# Patient Record
Sex: FEMALE | Race: WHITE | Employment: FULL TIME | URBAN - METROPOLITAN AREA
[De-identification: names, ages, dates, MRNs, and addresses within clinical notes are randomized per-mention and may not be internally consistent; named-entity substitution may affect disease eponyms.]

---

## 2017-01-18 ENCOUNTER — TELEPHONE (OUTPATIENT)
Dept: FAMILY MEDICINE CLINIC | Age: 47
End: 2017-01-18

## 2017-02-14 RX ORDER — TRAMADOL HYDROCHLORIDE 50 MG/1
TABLET ORAL
Qty: 120 TABLET | Refills: 0 | Status: SHIPPED | OUTPATIENT
Start: 2017-02-14 | End: 2019-11-01

## 2017-05-01 ENCOUNTER — OFFICE VISIT (OUTPATIENT)
Dept: FAMILY MEDICINE CLINIC | Age: 47
End: 2017-05-01

## 2017-05-01 VITALS
WEIGHT: 162.4 LBS | DIASTOLIC BLOOD PRESSURE: 62 MMHG | SYSTOLIC BLOOD PRESSURE: 112 MMHG | BODY MASS INDEX: 23.3 KG/M2 | HEART RATE: 68 BPM

## 2017-05-01 DIAGNOSIS — F90.0 ADHD, PREDOMINANTLY INATTENTIVE TYPE: Primary | ICD-10-CM

## 2017-05-01 DIAGNOSIS — M54.31 SCIATICA OF RIGHT SIDE: ICD-10-CM

## 2017-05-01 DIAGNOSIS — M96.1 POST LAMINECTOMY SYNDROME: ICD-10-CM

## 2017-05-01 PROCEDURE — 99213 OFFICE O/P EST LOW 20 MIN: CPT | Performed by: FAMILY MEDICINE

## 2017-05-01 RX ORDER — DEXTROAMPHETAMINE SACCHARATE, AMPHETAMINE ASPARTATE MONOHYDRATE, DEXTROAMPHETAMINE SULFATE AND AMPHETAMINE SULFATE 7.5; 7.5; 7.5; 7.5 MG/1; MG/1; MG/1; MG/1
30 CAPSULE, EXTENDED RELEASE ORAL EVERY MORNING
Qty: 30 CAPSULE | Refills: 0 | Status: SHIPPED | OUTPATIENT
Start: 2017-05-01 | End: 2017-08-04 | Stop reason: SDUPTHER

## 2017-05-01 RX ORDER — DEXTROAMPHETAMINE SACCHARATE, AMPHETAMINE ASPARTATE, DEXTROAMPHETAMINE SULFATE AND AMPHETAMINE SULFATE 5; 5; 5; 5 MG/1; MG/1; MG/1; MG/1
20 TABLET ORAL DAILY
Qty: 30 TABLET | Refills: 0 | Status: SHIPPED | OUTPATIENT
Start: 2017-05-01 | End: 2017-08-04 | Stop reason: SDUPTHER

## 2017-05-01 RX ORDER — TRAMADOL HYDROCHLORIDE 50 MG/1
50 TABLET ORAL EVERY 6 HOURS PRN
Qty: 120 TABLET | Refills: 0 | Status: SHIPPED | OUTPATIENT
Start: 2017-05-01 | End: 2017-06-30 | Stop reason: SDUPTHER

## 2017-06-30 RX ORDER — TRAMADOL HYDROCHLORIDE 50 MG/1
TABLET ORAL
Qty: 120 TABLET | Refills: 0 | Status: SHIPPED | OUTPATIENT
Start: 2017-06-30 | End: 2017-12-07 | Stop reason: SDUPTHER

## 2017-08-04 ENCOUNTER — OFFICE VISIT (OUTPATIENT)
Dept: FAMILY MEDICINE CLINIC | Age: 47
End: 2017-08-04

## 2017-08-04 VITALS
HEART RATE: 64 BPM | DIASTOLIC BLOOD PRESSURE: 68 MMHG | SYSTOLIC BLOOD PRESSURE: 108 MMHG | BODY MASS INDEX: 23.39 KG/M2 | WEIGHT: 163 LBS

## 2017-08-04 DIAGNOSIS — F90.0 ADHD, PREDOMINANTLY INATTENTIVE TYPE: Primary | ICD-10-CM

## 2017-08-04 PROCEDURE — 99213 OFFICE O/P EST LOW 20 MIN: CPT | Performed by: FAMILY MEDICINE

## 2017-08-04 RX ORDER — DEXTROAMPHETAMINE SACCHARATE, AMPHETAMINE ASPARTATE MONOHYDRATE, DEXTROAMPHETAMINE SULFATE AND AMPHETAMINE SULFATE 7.5; 7.5; 7.5; 7.5 MG/1; MG/1; MG/1; MG/1
30 CAPSULE, EXTENDED RELEASE ORAL EVERY MORNING
Qty: 30 CAPSULE | Refills: 0 | Status: SHIPPED | OUTPATIENT
Start: 2017-08-04 | End: 2017-11-22 | Stop reason: SDUPTHER

## 2017-08-04 RX ORDER — DEXTROAMPHETAMINE SACCHARATE, AMPHETAMINE ASPARTATE, DEXTROAMPHETAMINE SULFATE AND AMPHETAMINE SULFATE 5; 5; 5; 5 MG/1; MG/1; MG/1; MG/1
20 TABLET ORAL DAILY
Qty: 30 TABLET | Refills: 0 | Status: SHIPPED | OUTPATIENT
Start: 2017-08-04 | End: 2017-11-22 | Stop reason: SDUPTHER

## 2017-08-04 RX ORDER — DEXTROAMPHETAMINE SACCHARATE, AMPHETAMINE ASPARTATE, DEXTROAMPHETAMINE SULFATE AND AMPHETAMINE SULFATE 5; 5; 5; 5 MG/1; MG/1; MG/1; MG/1
20 TABLET ORAL DAILY
Qty: 30 TABLET | Refills: 0 | Status: SHIPPED | OUTPATIENT
Start: 2017-08-04 | End: 2017-09-18 | Stop reason: SDUPTHER

## 2017-08-04 RX ORDER — DEXTROAMPHETAMINE SACCHARATE, AMPHETAMINE ASPARTATE, DEXTROAMPHETAMINE SULFATE AND AMPHETAMINE SULFATE 5; 5; 5; 5 MG/1; MG/1; MG/1; MG/1
20 TABLET ORAL DAILY
Qty: 30 TABLET | Refills: 0 | Status: SHIPPED | OUTPATIENT
Start: 2017-08-04 | End: 2018-03-12 | Stop reason: SDUPTHER

## 2017-08-04 RX ORDER — DEXTROAMPHETAMINE SACCHARATE, AMPHETAMINE ASPARTATE MONOHYDRATE, DEXTROAMPHETAMINE SULFATE AND AMPHETAMINE SULFATE 7.5; 7.5; 7.5; 7.5 MG/1; MG/1; MG/1; MG/1
30 CAPSULE, EXTENDED RELEASE ORAL EVERY MORNING
Qty: 30 CAPSULE | Refills: 0 | Status: SHIPPED | OUTPATIENT
Start: 2017-08-04 | End: 2017-11-02 | Stop reason: SDUPTHER

## 2017-09-18 RX ORDER — DEXTROAMPHETAMINE SACCHARATE, AMPHETAMINE ASPARTATE, DEXTROAMPHETAMINE SULFATE AND AMPHETAMINE SULFATE 5; 5; 5; 5 MG/1; MG/1; MG/1; MG/1
20 TABLET ORAL DAILY
Qty: 30 TABLET | Refills: 0 | Status: SHIPPED | OUTPATIENT
Start: 2017-09-18 | End: 2017-11-22 | Stop reason: SDUPTHER

## 2017-11-02 ENCOUNTER — TELEPHONE (OUTPATIENT)
Dept: FAMILY MEDICINE CLINIC | Age: 47
End: 2017-11-02

## 2017-11-02 RX ORDER — DEXTROAMPHETAMINE SACCHARATE, AMPHETAMINE ASPARTATE MONOHYDRATE, DEXTROAMPHETAMINE SULFATE AND AMPHETAMINE SULFATE 7.5; 7.5; 7.5; 7.5 MG/1; MG/1; MG/1; MG/1
30 CAPSULE, EXTENDED RELEASE ORAL EVERY MORNING
Qty: 30 CAPSULE | Refills: 0 | Status: SHIPPED | OUTPATIENT
Start: 2017-11-02 | End: 2017-11-22 | Stop reason: SDUPTHER

## 2017-11-02 NOTE — TELEPHONE ENCOUNTER
Pt states she needs a refill of Adderall XR 30 mg. She still has some Adderall 20 mg.       Last office visit 8/4/17  Future visit 11/7/17

## 2017-11-22 ENCOUNTER — OFFICE VISIT (OUTPATIENT)
Dept: FAMILY MEDICINE CLINIC | Age: 47
End: 2017-11-22

## 2017-11-22 VITALS
WEIGHT: 165 LBS | HEART RATE: 73 BPM | DIASTOLIC BLOOD PRESSURE: 88 MMHG | BODY MASS INDEX: 23.68 KG/M2 | SYSTOLIC BLOOD PRESSURE: 130 MMHG

## 2017-11-22 DIAGNOSIS — F90.0 ADHD, PREDOMINANTLY INATTENTIVE TYPE: Primary | ICD-10-CM

## 2017-11-22 PROCEDURE — 99213 OFFICE O/P EST LOW 20 MIN: CPT | Performed by: FAMILY MEDICINE

## 2017-11-22 RX ORDER — DEXTROAMPHETAMINE SACCHARATE, AMPHETAMINE ASPARTATE, DEXTROAMPHETAMINE SULFATE AND AMPHETAMINE SULFATE 5; 5; 5; 5 MG/1; MG/1; MG/1; MG/1
20 TABLET ORAL DAILY
Qty: 30 TABLET | Refills: 0 | Status: SHIPPED | OUTPATIENT
Start: 2017-11-22 | End: 2018-03-02 | Stop reason: SDUPTHER

## 2017-11-22 RX ORDER — DEXTROAMPHETAMINE SACCHARATE, AMPHETAMINE ASPARTATE MONOHYDRATE, DEXTROAMPHETAMINE SULFATE AND AMPHETAMINE SULFATE 7.5; 7.5; 7.5; 7.5 MG/1; MG/1; MG/1; MG/1
30 CAPSULE, EXTENDED RELEASE ORAL EVERY MORNING
Qty: 30 CAPSULE | Refills: 0 | Status: SHIPPED | OUTPATIENT
Start: 2017-11-22 | End: 2018-03-12 | Stop reason: SDUPTHER

## 2017-11-22 RX ORDER — DEXTROAMPHETAMINE SACCHARATE, AMPHETAMINE ASPARTATE, DEXTROAMPHETAMINE SULFATE AND AMPHETAMINE SULFATE 5; 5; 5; 5 MG/1; MG/1; MG/1; MG/1
20 TABLET ORAL DAILY
Qty: 30 TABLET | Refills: 0 | Status: SHIPPED | OUTPATIENT
Start: 2017-11-22 | End: 2018-03-12 | Stop reason: SDUPTHER

## 2017-11-22 RX ORDER — DEXTROAMPHETAMINE SACCHARATE, AMPHETAMINE ASPARTATE MONOHYDRATE, DEXTROAMPHETAMINE SULFATE AND AMPHETAMINE SULFATE 7.5; 7.5; 7.5; 7.5 MG/1; MG/1; MG/1; MG/1
30 CAPSULE, EXTENDED RELEASE ORAL EVERY MORNING
Qty: 30 CAPSULE | Refills: 0 | Status: SHIPPED | OUTPATIENT
Start: 2017-11-22 | End: 2018-03-02 | Stop reason: SDUPTHER

## 2017-11-22 ASSESSMENT — PATIENT HEALTH QUESTIONNAIRE - PHQ9
2. FEELING DOWN, DEPRESSED OR HOPELESS: 0
SUM OF ALL RESPONSES TO PHQ9 QUESTIONS 1 & 2: 0
SUM OF ALL RESPONSES TO PHQ QUESTIONS 1-9: 0
1. LITTLE INTEREST OR PLEASURE IN DOING THINGS: 0

## 2017-11-22 NOTE — PROGRESS NOTES
Subjective:      Patient ID: Chrys Simmonds is a 49 y.o. female. HPIAdderall regimen working well. Focus good, completing tasks,less distractible. No SEs    Review of Systems    Objective:   Physical Exam   Constitutional: She appears well-developed and well-nourished. Cardiovascular: Normal rate, regular rhythm and normal heart sounds. No murmur heard. Pulmonary/Chest: Effort normal and breath sounds normal.   Psychiatric: She has a normal mood and affect. Her behavior is normal.     Vitals:    11/22/17 1525   BP: 130/88   Pulse: 73   Weight: 165 lb (74.8 kg)     Controlled Substances Monitoring:     Attestation: The Prescription Monitoring Report for this patient was reviewed today. Arian Orantes MD)  Documentation: No signs of potential drug abuse or diversion identified.  Arian Orantes MD)      Assessment:    ADHD controlled          Plan:      Refill meds  Return in 6 mos

## 2017-12-09 RX ORDER — TRAMADOL HYDROCHLORIDE 50 MG/1
TABLET ORAL
Qty: 120 TABLET | Refills: 0 | Status: SHIPPED | OUTPATIENT
Start: 2017-12-09 | End: 2018-03-29 | Stop reason: SDUPTHER

## 2018-03-01 ENCOUNTER — TELEPHONE (OUTPATIENT)
Dept: FAMILY MEDICINE CLINIC | Age: 48
End: 2018-03-01

## 2018-03-02 ENCOUNTER — TELEPHONE (OUTPATIENT)
Dept: FAMILY MEDICINE CLINIC | Age: 48
End: 2018-03-02

## 2018-03-02 DIAGNOSIS — F90.0 ADHD, PREDOMINANTLY INATTENTIVE TYPE: Primary | ICD-10-CM

## 2018-03-02 RX ORDER — DEXTROAMPHETAMINE SACCHARATE, AMPHETAMINE ASPARTATE MONOHYDRATE, DEXTROAMPHETAMINE SULFATE AND AMPHETAMINE SULFATE 7.5; 7.5; 7.5; 7.5 MG/1; MG/1; MG/1; MG/1
30 CAPSULE, EXTENDED RELEASE ORAL EVERY MORNING
Qty: 30 CAPSULE | Refills: 0 | Status: SHIPPED | OUTPATIENT
Start: 2018-03-02 | End: 2018-03-12 | Stop reason: SDUPTHER

## 2018-03-02 RX ORDER — DEXTROAMPHETAMINE SACCHARATE, AMPHETAMINE ASPARTATE, DEXTROAMPHETAMINE SULFATE AND AMPHETAMINE SULFATE 5; 5; 5; 5 MG/1; MG/1; MG/1; MG/1
20 TABLET ORAL DAILY
Qty: 30 TABLET | Refills: 0 | Status: SHIPPED | OUTPATIENT
Start: 2018-03-02 | End: 2018-03-12 | Stop reason: SDUPTHER

## 2018-03-02 NOTE — TELEPHONE ENCOUNTER
Per the message yesterday, the pt needed the Adderall 20 mg. A script for Adderall XR 30 was sent over by mistake. Please send ovver the Adderall 20 mg.     Last office visit 11/22/17  Future appt 3/5/18

## 2018-03-12 ENCOUNTER — OFFICE VISIT (OUTPATIENT)
Dept: FAMILY MEDICINE CLINIC | Age: 48
End: 2018-03-12

## 2018-03-12 VITALS
BODY MASS INDEX: 23.48 KG/M2 | SYSTOLIC BLOOD PRESSURE: 104 MMHG | HEIGHT: 70 IN | HEART RATE: 61 BPM | WEIGHT: 164 LBS | DIASTOLIC BLOOD PRESSURE: 82 MMHG

## 2018-03-12 DIAGNOSIS — F90.0 ATTENTION DEFICIT HYPERACTIVITY DISORDER (ADHD), PREDOMINANTLY INATTENTIVE TYPE: Primary | ICD-10-CM

## 2018-03-12 PROCEDURE — 99213 OFFICE O/P EST LOW 20 MIN: CPT | Performed by: FAMILY MEDICINE

## 2018-03-12 RX ORDER — DEXTROAMPHETAMINE SACCHARATE, AMPHETAMINE ASPARTATE, DEXTROAMPHETAMINE SULFATE AND AMPHETAMINE SULFATE 5; 5; 5; 5 MG/1; MG/1; MG/1; MG/1
20 TABLET ORAL DAILY
Qty: 30 TABLET | Refills: 0 | Status: SHIPPED | OUTPATIENT
Start: 2018-04-01 | End: 2018-07-02 | Stop reason: SDUPTHER

## 2018-03-12 RX ORDER — DEXTROAMPHETAMINE SACCHARATE, AMPHETAMINE ASPARTATE, DEXTROAMPHETAMINE SULFATE AND AMPHETAMINE SULFATE 5; 5; 5; 5 MG/1; MG/1; MG/1; MG/1
20 TABLET ORAL DAILY
Qty: 30 TABLET | Refills: 0 | Status: SHIPPED | OUTPATIENT
Start: 2018-05-01 | End: 2018-07-03 | Stop reason: SDUPTHER

## 2018-03-12 RX ORDER — DEXTROAMPHETAMINE SACCHARATE, AMPHETAMINE ASPARTATE MONOHYDRATE, DEXTROAMPHETAMINE SULFATE AND AMPHETAMINE SULFATE 7.5; 7.5; 7.5; 7.5 MG/1; MG/1; MG/1; MG/1
30 CAPSULE, EXTENDED RELEASE ORAL EVERY MORNING
Qty: 30 CAPSULE | Refills: 0 | Status: SHIPPED | OUTPATIENT
Start: 2018-04-12 | End: 2018-07-03 | Stop reason: SDUPTHER

## 2018-03-12 RX ORDER — DEXTROAMPHETAMINE SACCHARATE, AMPHETAMINE ASPARTATE, DEXTROAMPHETAMINE SULFATE AND AMPHETAMINE SULFATE 5; 5; 5; 5 MG/1; MG/1; MG/1; MG/1
20 TABLET ORAL DAILY
Qty: 30 TABLET | Refills: 0 | Status: SHIPPED | OUTPATIENT
Start: 2018-05-31 | End: 2018-10-03 | Stop reason: SDUPTHER

## 2018-03-12 RX ORDER — DEXTROAMPHETAMINE SACCHARATE, AMPHETAMINE ASPARTATE MONOHYDRATE, DEXTROAMPHETAMINE SULFATE AND AMPHETAMINE SULFATE 7.5; 7.5; 7.5; 7.5 MG/1; MG/1; MG/1; MG/1
30 CAPSULE, EXTENDED RELEASE ORAL EVERY MORNING
Qty: 30 CAPSULE | Refills: 0 | Status: SHIPPED | OUTPATIENT
Start: 2018-05-12 | End: 2019-01-10 | Stop reason: SDUPTHER

## 2018-03-12 RX ORDER — DEXTROAMPHETAMINE SACCHARATE, AMPHETAMINE ASPARTATE MONOHYDRATE, DEXTROAMPHETAMINE SULFATE AND AMPHETAMINE SULFATE 7.5; 7.5; 7.5; 7.5 MG/1; MG/1; MG/1; MG/1
30 CAPSULE, EXTENDED RELEASE ORAL EVERY MORNING
Qty: 30 CAPSULE | Refills: 0 | Status: SHIPPED | OUTPATIENT
Start: 2018-03-13 | End: 2018-10-03 | Stop reason: SDUPTHER

## 2018-03-12 NOTE — PROGRESS NOTES
Subjective:      Patient ID: Gayatri Serrano is a 49 y.o. female. HPI  No change in health. 1st dose lasts ~ 8 hrs, then she feels less engaged in activities, less focussed. 2nd dose lasts until supper. Lunch appetite suppressed. No palpitations, no headaches  Tramadol when the back ache flares and not controlled on ibuprofen    Review of Systems    Objective:   Physical Exam   Constitutional: She appears well-developed and well-nourished. Cardiovascular: Normal rate, regular rhythm and normal heart sounds. Pulmonary/Chest: Effort normal and breath sounds normal.   Psychiatric: She has a normal mood and affect. Her behavior is normal.       Assessment:      ADHD, controlled      Plan:      Refill meds  Call in 3 months and see me in 6 mos.

## 2018-03-29 DIAGNOSIS — M54.31 SCIATICA OF RIGHT SIDE: Primary | ICD-10-CM

## 2018-03-29 RX ORDER — TRAMADOL HYDROCHLORIDE 50 MG/1
TABLET ORAL
Qty: 120 TABLET | Refills: 0 | Status: SHIPPED | OUTPATIENT
Start: 2018-03-29 | End: 2018-04-28

## 2018-06-21 ENCOUNTER — TELEPHONE (OUTPATIENT)
Dept: FAMILY MEDICINE CLINIC | Age: 48
End: 2018-06-21

## 2018-06-21 RX ORDER — ACYCLOVIR 400 MG/1
400 TABLET ORAL 3 TIMES DAILY
Qty: 30 TABLET | Refills: 0 | Status: SHIPPED | OUTPATIENT
Start: 2018-06-21 | End: 2018-07-01

## 2018-06-22 ENCOUNTER — TELEPHONE (OUTPATIENT)
Dept: FAMILY MEDICINE CLINIC | Age: 48
End: 2018-06-22

## 2018-07-02 DIAGNOSIS — F90.0 ADHD, PREDOMINANTLY INATTENTIVE TYPE: Primary | ICD-10-CM

## 2018-07-03 RX ORDER — DEXTROAMPHETAMINE SACCHARATE, AMPHETAMINE ASPARTATE MONOHYDRATE, DEXTROAMPHETAMINE SULFATE AND AMPHETAMINE SULFATE 7.5; 7.5; 7.5; 7.5 MG/1; MG/1; MG/1; MG/1
30 CAPSULE, EXTENDED RELEASE ORAL EVERY MORNING
Qty: 30 CAPSULE | Refills: 0 | Status: SHIPPED | OUTPATIENT
Start: 2018-08-02 | End: 2018-08-31 | Stop reason: SDUPTHER

## 2018-07-03 RX ORDER — DEXTROAMPHETAMINE SACCHARATE, AMPHETAMINE ASPARTATE, DEXTROAMPHETAMINE SULFATE AND AMPHETAMINE SULFATE 5; 5; 5; 5 MG/1; MG/1; MG/1; MG/1
20 TABLET ORAL DAILY
Qty: 30 TABLET | Refills: 0 | Status: SHIPPED | OUTPATIENT
Start: 2018-07-03 | End: 2018-10-03 | Stop reason: SDUPTHER

## 2018-07-03 RX ORDER — DEXTROAMPHETAMINE SACCHARATE, AMPHETAMINE ASPARTATE MONOHYDRATE, DEXTROAMPHETAMINE SULFATE AND AMPHETAMINE SULFATE 7.5; 7.5; 7.5; 7.5 MG/1; MG/1; MG/1; MG/1
30 CAPSULE, EXTENDED RELEASE ORAL EVERY MORNING
Qty: 30 CAPSULE | Refills: 0 | Status: SHIPPED | OUTPATIENT
Start: 2018-07-03 | End: 2018-10-03 | Stop reason: SDUPTHER

## 2018-07-03 RX ORDER — DEXTROAMPHETAMINE SACCHARATE, AMPHETAMINE ASPARTATE, DEXTROAMPHETAMINE SULFATE AND AMPHETAMINE SULFATE 5; 5; 5; 5 MG/1; MG/1; MG/1; MG/1
20 TABLET ORAL DAILY
Qty: 30 TABLET | Refills: 0 | Status: SHIPPED | OUTPATIENT
Start: 2018-08-02 | End: 2018-08-31 | Stop reason: SDUPTHER

## 2018-08-31 DIAGNOSIS — F90.0 ADHD, PREDOMINANTLY INATTENTIVE TYPE: ICD-10-CM

## 2018-08-31 RX ORDER — DEXTROAMPHETAMINE SACCHARATE, AMPHETAMINE ASPARTATE MONOHYDRATE, DEXTROAMPHETAMINE SULFATE AND AMPHETAMINE SULFATE 7.5; 7.5; 7.5; 7.5 MG/1; MG/1; MG/1; MG/1
30 CAPSULE, EXTENDED RELEASE ORAL EVERY MORNING
Qty: 30 CAPSULE | Refills: 0 | Status: SHIPPED | OUTPATIENT
Start: 2018-08-31 | End: 2018-10-03 | Stop reason: SDUPTHER

## 2018-08-31 RX ORDER — DEXTROAMPHETAMINE SACCHARATE, AMPHETAMINE ASPARTATE, DEXTROAMPHETAMINE SULFATE AND AMPHETAMINE SULFATE 5; 5; 5; 5 MG/1; MG/1; MG/1; MG/1
20 TABLET ORAL DAILY
Qty: 30 TABLET | Refills: 0 | Status: SHIPPED | OUTPATIENT
Start: 2018-08-31 | End: 2018-09-28 | Stop reason: SDUPTHER

## 2018-08-31 NOTE — TELEPHONE ENCOUNTER
Pt  ADDERALL  Was called in but it is the wrong mg she asked for the 20 mg   They filled for amphetamine-dextroamphetamine (ADDERALL XR) 30 MG extended release capsule      [id number], [place] - [address] [facility] -  [phone]

## 2018-09-12 ENCOUNTER — TELEPHONE (OUTPATIENT)
Dept: FAMILY MEDICINE CLINIC | Age: 48
End: 2018-09-12

## 2018-09-28 ENCOUNTER — TELEPHONE (OUTPATIENT)
Dept: FAMILY MEDICINE CLINIC | Age: 48
End: 2018-09-28

## 2018-09-28 DIAGNOSIS — F90.0 ADHD, PREDOMINANTLY INATTENTIVE TYPE: ICD-10-CM

## 2018-09-28 RX ORDER — DEXTROAMPHETAMINE SACCHARATE, AMPHETAMINE ASPARTATE, DEXTROAMPHETAMINE SULFATE AND AMPHETAMINE SULFATE 5; 5; 5; 5 MG/1; MG/1; MG/1; MG/1
20 TABLET ORAL DAILY
Qty: 30 TABLET | Refills: 0 | Status: SHIPPED | OUTPATIENT
Start: 2018-09-28 | End: 2018-10-03 | Stop reason: SDUPTHER

## 2018-09-28 NOTE — TELEPHONE ENCOUNTER
Pt an appointment and would like to know if she can have the refill for the  amphetamine-dextroamphetamine (ADDERALL, 20MG,) 20 MG tablet     Her future appointment is on 10/3/2018      [id number]- [place], [place] - [address] [place] [address] -  [phone]

## 2018-10-03 ENCOUNTER — OFFICE VISIT (OUTPATIENT)
Dept: FAMILY MEDICINE CLINIC | Age: 48
End: 2018-10-03
Payer: COMMERCIAL

## 2018-10-03 VITALS
WEIGHT: 160 LBS | DIASTOLIC BLOOD PRESSURE: 70 MMHG | RESPIRATION RATE: 14 BRPM | HEART RATE: 88 BPM | SYSTOLIC BLOOD PRESSURE: 120 MMHG | BODY MASS INDEX: 22.96 KG/M2 | OXYGEN SATURATION: 99 %

## 2018-10-03 DIAGNOSIS — F17.200 TOBACCO USE DISORDER: Primary | ICD-10-CM

## 2018-10-03 DIAGNOSIS — F90.0 ADHD, PREDOMINANTLY INATTENTIVE TYPE: ICD-10-CM

## 2018-10-03 PROCEDURE — 99213 OFFICE O/P EST LOW 20 MIN: CPT | Performed by: FAMILY MEDICINE

## 2018-10-03 RX ORDER — DEXTROAMPHETAMINE SACCHARATE, AMPHETAMINE ASPARTATE, DEXTROAMPHETAMINE SULFATE AND AMPHETAMINE SULFATE 5; 5; 5; 5 MG/1; MG/1; MG/1; MG/1
20 TABLET ORAL DAILY
Qty: 30 TABLET | Refills: 0 | Status: SHIPPED | OUTPATIENT
Start: 2018-11-27 | End: 2019-01-11 | Stop reason: SDUPTHER

## 2018-10-03 RX ORDER — DEXTROAMPHETAMINE SACCHARATE, AMPHETAMINE ASPARTATE MONOHYDRATE, DEXTROAMPHETAMINE SULFATE AND AMPHETAMINE SULFATE 7.5; 7.5; 7.5; 7.5 MG/1; MG/1; MG/1; MG/1
30 CAPSULE, EXTENDED RELEASE ORAL EVERY MORNING
Qty: 30 CAPSULE | Refills: 0 | Status: SHIPPED | OUTPATIENT
Start: 2018-12-09 | End: 2019-01-11 | Stop reason: SDUPTHER

## 2018-10-03 RX ORDER — VARENICLINE TARTRATE 1 MG/1
1 TABLET, FILM COATED ORAL 2 TIMES DAILY
Qty: 60 TABLET | Refills: 3 | Status: SHIPPED | OUTPATIENT
Start: 2018-10-03 | End: 2020-06-04

## 2018-10-03 RX ORDER — DEXTROAMPHETAMINE SACCHARATE, AMPHETAMINE ASPARTATE MONOHYDRATE, DEXTROAMPHETAMINE SULFATE AND AMPHETAMINE SULFATE 7.5; 7.5; 7.5; 7.5 MG/1; MG/1; MG/1; MG/1
30 CAPSULE, EXTENDED RELEASE ORAL EVERY MORNING
Qty: 30 CAPSULE | Refills: 0 | Status: SHIPPED | OUTPATIENT
Start: 2018-11-09 | End: 2019-01-11 | Stop reason: SDUPTHER

## 2018-10-03 RX ORDER — VARENICLINE TARTRATE 25 MG
KIT ORAL
Qty: 1 EACH | Refills: 0 | Status: SHIPPED | OUTPATIENT
Start: 2018-10-03 | End: 2018-11-18 | Stop reason: SDUPTHER

## 2018-10-03 RX ORDER — DEXTROAMPHETAMINE SACCHARATE, AMPHETAMINE ASPARTATE, DEXTROAMPHETAMINE SULFATE AND AMPHETAMINE SULFATE 5; 5; 5; 5 MG/1; MG/1; MG/1; MG/1
20 TABLET ORAL DAILY
Qty: 30 TABLET | Refills: 0 | Status: SHIPPED | OUTPATIENT
Start: 2018-12-27 | End: 2019-01-11 | Stop reason: SDUPTHER

## 2018-10-03 RX ORDER — DEXTROAMPHETAMINE SACCHARATE, AMPHETAMINE ASPARTATE, DEXTROAMPHETAMINE SULFATE AND AMPHETAMINE SULFATE 5; 5; 5; 5 MG/1; MG/1; MG/1; MG/1
20 TABLET ORAL DAILY
Qty: 30 TABLET | Refills: 0 | Status: SHIPPED | OUTPATIENT
Start: 2018-10-28 | End: 2019-04-18 | Stop reason: SDUPTHER

## 2018-10-03 RX ORDER — DEXTROAMPHETAMINE SACCHARATE, AMPHETAMINE ASPARTATE MONOHYDRATE, DEXTROAMPHETAMINE SULFATE AND AMPHETAMINE SULFATE 7.5; 7.5; 7.5; 7.5 MG/1; MG/1; MG/1; MG/1
30 CAPSULE, EXTENDED RELEASE ORAL EVERY MORNING
Qty: 30 CAPSULE | Refills: 0 | Status: SHIPPED | OUTPATIENT
Start: 2018-10-10 | End: 2021-10-19

## 2018-10-03 ASSESSMENT — PATIENT HEALTH QUESTIONNAIRE - PHQ9
SUM OF ALL RESPONSES TO PHQ QUESTIONS 1-9: 0
SUM OF ALL RESPONSES TO PHQ QUESTIONS 1-9: 0
2. FEELING DOWN, DEPRESSED OR HOPELESS: 0
SUM OF ALL RESPONSES TO PHQ9 QUESTIONS 1 & 2: 0
1. LITTLE INTEREST OR PLEASURE IN DOING THINGS: 0

## 2018-11-19 RX ORDER — VARENICLINE TARTRATE
KIT
Qty: 53 TABLET | Refills: 1 | Status: SHIPPED | OUTPATIENT
Start: 2018-11-19 | End: 2019-11-01

## 2019-01-10 ENCOUNTER — TELEPHONE (OUTPATIENT)
Dept: FAMILY MEDICINE CLINIC | Age: 49
End: 2019-01-10

## 2019-01-10 DIAGNOSIS — F90.0 ADHD, PREDOMINANTLY INATTENTIVE TYPE: ICD-10-CM

## 2019-01-10 RX ORDER — DEXTROAMPHETAMINE SACCHARATE, AMPHETAMINE ASPARTATE, DEXTROAMPHETAMINE SULFATE AND AMPHETAMINE SULFATE 5; 5; 5; 5 MG/1; MG/1; MG/1; MG/1
20 TABLET ORAL DAILY
Qty: 30 TABLET | Refills: 0 | Status: CANCELLED | OUTPATIENT
Start: 2019-01-10 | End: 2019-02-09

## 2019-01-11 DIAGNOSIS — F90.0 ADHD, PREDOMINANTLY INATTENTIVE TYPE: ICD-10-CM

## 2019-01-11 RX ORDER — DEXTROAMPHETAMINE SACCHARATE, AMPHETAMINE ASPARTATE, DEXTROAMPHETAMINE SULFATE AND AMPHETAMINE SULFATE 5; 5; 5; 5 MG/1; MG/1; MG/1; MG/1
20 TABLET ORAL DAILY
Qty: 30 TABLET | Refills: 0 | Status: SHIPPED | OUTPATIENT
Start: 2019-01-27 | End: 2019-04-18 | Stop reason: SDUPTHER

## 2019-01-11 RX ORDER — DEXTROAMPHETAMINE SACCHARATE, AMPHETAMINE ASPARTATE, DEXTROAMPHETAMINE SULFATE AND AMPHETAMINE SULFATE 5; 5; 5; 5 MG/1; MG/1; MG/1; MG/1
20 TABLET ORAL DAILY
Qty: 30 TABLET | Refills: 0 | Status: SHIPPED | OUTPATIENT
Start: 2019-02-26 | End: 2019-03-11 | Stop reason: SDUPTHER

## 2019-01-11 RX ORDER — DEXTROAMPHETAMINE SACCHARATE, AMPHETAMINE ASPARTATE MONOHYDRATE, DEXTROAMPHETAMINE SULFATE AND AMPHETAMINE SULFATE 7.5; 7.5; 7.5; 7.5 MG/1; MG/1; MG/1; MG/1
30 CAPSULE, EXTENDED RELEASE ORAL EVERY MORNING
Qty: 30 CAPSULE | Refills: 0 | Status: SHIPPED | OUTPATIENT
Start: 2019-01-11 | End: 2019-04-18 | Stop reason: SDUPTHER

## 2019-01-11 RX ORDER — DEXTROAMPHETAMINE SACCHARATE, AMPHETAMINE ASPARTATE MONOHYDRATE, DEXTROAMPHETAMINE SULFATE AND AMPHETAMINE SULFATE 7.5; 7.5; 7.5; 7.5 MG/1; MG/1; MG/1; MG/1
30 CAPSULE, EXTENDED RELEASE ORAL EVERY MORNING
Qty: 30 CAPSULE | Refills: 0 | Status: SHIPPED | OUTPATIENT
Start: 2019-02-10 | End: 2019-03-11 | Stop reason: SDUPTHER

## 2019-03-11 DIAGNOSIS — F90.0 ADHD, PREDOMINANTLY INATTENTIVE TYPE: ICD-10-CM

## 2019-03-11 RX ORDER — DEXTROAMPHETAMINE SACCHARATE, AMPHETAMINE ASPARTATE, DEXTROAMPHETAMINE SULFATE AND AMPHETAMINE SULFATE 5; 5; 5; 5 MG/1; MG/1; MG/1; MG/1
20 TABLET ORAL DAILY
Qty: 30 TABLET | Refills: 0 | Status: SHIPPED | OUTPATIENT
Start: 2019-03-11 | End: 2019-04-18 | Stop reason: SDUPTHER

## 2019-03-11 RX ORDER — DEXTROAMPHETAMINE SACCHARATE, AMPHETAMINE ASPARTATE MONOHYDRATE, DEXTROAMPHETAMINE SULFATE AND AMPHETAMINE SULFATE 7.5; 7.5; 7.5; 7.5 MG/1; MG/1; MG/1; MG/1
30 CAPSULE, EXTENDED RELEASE ORAL EVERY MORNING
Qty: 30 CAPSULE | Refills: 0 | Status: SHIPPED | OUTPATIENT
Start: 2019-03-11 | End: 2019-04-09 | Stop reason: SDUPTHER

## 2019-04-09 DIAGNOSIS — F90.0 ADHD, PREDOMINANTLY INATTENTIVE TYPE: ICD-10-CM

## 2019-04-09 RX ORDER — DEXTROAMPHETAMINE SACCHARATE, AMPHETAMINE ASPARTATE MONOHYDRATE, DEXTROAMPHETAMINE SULFATE AND AMPHETAMINE SULFATE 7.5; 7.5; 7.5; 7.5 MG/1; MG/1; MG/1; MG/1
30 CAPSULE, EXTENDED RELEASE ORAL EVERY MORNING
Qty: 30 CAPSULE | Refills: 0 | Status: SHIPPED | OUTPATIENT
Start: 2019-04-09 | End: 2019-07-09 | Stop reason: SDUPTHER

## 2019-04-09 NOTE — TELEPHONE ENCOUNTER
Medication:   Requested Prescriptions     Pending Prescriptions Disp Refills    amphetamine-dextroamphetamine (ADDERALL XR) 30 MG extended release capsule 30 capsule 0     Sig: Take 1 capsule by mouth every morning for 30 days. Last Filled: 03/11/2019     Last appt: 10/3/2018   Next appt: 4/18/2019    Last OARRS:   RX Monitoring 3/11/2019   Attestation The Prescription Monitoring Report for this patient was reviewed today.    Chronic Pain Routine Monitoring No signs of potential drug abuse or diversion identified: otherwise, see note documentation

## 2019-04-12 ENCOUNTER — TELEPHONE (OUTPATIENT)
Dept: FAMILY MEDICINE CLINIC | Age: 49
End: 2019-04-12

## 2019-04-12 NOTE — TELEPHONE ENCOUNTER
Phillip Hardy is calling. They need our office to call and do a PA for the Adderall XR 30 mg. There is no reference number. Call [phone].     LOV 10/3/18

## 2019-04-13 NOTE — TELEPHONE ENCOUNTER
Please call pt today about this. She is very frustrated. She says she spoke to someone last week about this. She says she was told by someone at our office that they would call and get a verbal on this. She also says that [address] sent a form to be filled out. No one filled it out yet. She says this happens very time. Please call her at [phone].

## 2019-04-13 NOTE — TELEPHONE ENCOUNTER
Pt called back again, no form was found. I tried to call for verbal was put on hold for 15 mins no one picked up. Someone will need to try on Monday.

## 2019-04-18 ENCOUNTER — OFFICE VISIT (OUTPATIENT)
Dept: FAMILY MEDICINE CLINIC | Age: 49
End: 2019-04-18
Payer: COMMERCIAL

## 2019-04-18 VITALS
HEART RATE: 80 BPM | WEIGHT: 166 LBS | SYSTOLIC BLOOD PRESSURE: 110 MMHG | BODY MASS INDEX: 23.82 KG/M2 | DIASTOLIC BLOOD PRESSURE: 74 MMHG

## 2019-04-18 DIAGNOSIS — F90.0 ADHD, PREDOMINANTLY INATTENTIVE TYPE: ICD-10-CM

## 2019-04-18 PROCEDURE — 99213 OFFICE O/P EST LOW 20 MIN: CPT | Performed by: FAMILY MEDICINE

## 2019-04-18 RX ORDER — DEXTROAMPHETAMINE SACCHARATE, AMPHETAMINE ASPARTATE MONOHYDRATE, DEXTROAMPHETAMINE SULFATE AND AMPHETAMINE SULFATE 7.5; 7.5; 7.5; 7.5 MG/1; MG/1; MG/1; MG/1
30 CAPSULE, EXTENDED RELEASE ORAL EVERY MORNING
Qty: 30 CAPSULE | Refills: 0 | Status: SHIPPED | OUTPATIENT
Start: 2019-05-18 | End: 2019-07-09 | Stop reason: SDUPTHER

## 2019-04-18 RX ORDER — DEXTROAMPHETAMINE SACCHARATE, AMPHETAMINE ASPARTATE MONOHYDRATE, DEXTROAMPHETAMINE SULFATE AND AMPHETAMINE SULFATE 7.5; 7.5; 7.5; 7.5 MG/1; MG/1; MG/1; MG/1
30 CAPSULE, EXTENDED RELEASE ORAL EVERY MORNING
Qty: 30 CAPSULE | Refills: 0 | Status: SHIPPED | OUTPATIENT
Start: 2019-06-17 | End: 2019-07-09 | Stop reason: SDUPTHER

## 2019-04-18 RX ORDER — DEXTROAMPHETAMINE SACCHARATE, AMPHETAMINE ASPARTATE, DEXTROAMPHETAMINE SULFATE AND AMPHETAMINE SULFATE 5; 5; 5; 5 MG/1; MG/1; MG/1; MG/1
20 TABLET ORAL DAILY
Qty: 30 TABLET | Refills: 0 | Status: SHIPPED | OUTPATIENT
Start: 2019-05-28 | End: 2019-07-09 | Stop reason: SDUPTHER

## 2019-04-18 RX ORDER — DEXTROAMPHETAMINE SACCHARATE, AMPHETAMINE ASPARTATE, DEXTROAMPHETAMINE SULFATE AND AMPHETAMINE SULFATE 5; 5; 5; 5 MG/1; MG/1; MG/1; MG/1
20 TABLET ORAL DAILY
Qty: 30 TABLET | Refills: 0 | Status: SHIPPED | OUTPATIENT
Start: 2019-06-27 | End: 2019-07-09 | Stop reason: SDUPTHER

## 2019-04-18 RX ORDER — DEXTROAMPHETAMINE SACCHARATE, AMPHETAMINE ASPARTATE, DEXTROAMPHETAMINE SULFATE AND AMPHETAMINE SULFATE 5; 5; 5; 5 MG/1; MG/1; MG/1; MG/1
20 TABLET ORAL DAILY
Qty: 30 TABLET | Refills: 0 | Status: SHIPPED | OUTPATIENT
Start: 2019-04-28 | End: 2019-07-09 | Stop reason: SDUPTHER

## 2019-04-18 ASSESSMENT — PATIENT HEALTH QUESTIONNAIRE - PHQ9
1. LITTLE INTEREST OR PLEASURE IN DOING THINGS: 0
SUM OF ALL RESPONSES TO PHQ QUESTIONS 1-9: 0
SUM OF ALL RESPONSES TO PHQ9 QUESTIONS 1 & 2: 0
SUM OF ALL RESPONSES TO PHQ QUESTIONS 1-9: 0
2. FEELING DOWN, DEPRESSED OR HOPELESS: 0

## 2019-04-18 NOTE — PROGRESS NOTES
Subjective:      Patient ID: John Nuñez is a 51 y.o. female. HPIBack pain is manageable with ibup and yoga    Chantix : has been taking 0.5 mg hs, and is down to 2 cigs per day. Taking the med in am causes nausea    Adderall working well, but PA process was required again. Finds she completes tasks much better while on the med. No palp, no insomnia, no weight loss  Review of Systems    Objective:   Physical Exam   Constitutional: She appears well-developed and well-nourished. Neck: Normal range of motion. Neck supple. No thyromegaly present. Cardiovascular: Normal rate and regular rhythm. No murmur heard. Pulmonary/Chest: Effort normal and breath sounds normal.   Lymphadenopathy:     She has no cervical adenopathy. Psychiatric: She has a normal mood and affect.  Her behavior is normal.     Vitals:    04/18/19 0909   BP: 110/74   Pulse: 80   Weight: 166 lb (75.3 kg)       Assessment:    ADHD        Plan:      Refill Adderall  6 mos        Piper Ocampo MD

## 2019-07-09 ENCOUNTER — TELEPHONE (OUTPATIENT)
Dept: FAMILY MEDICINE CLINIC | Age: 49
End: 2019-07-09

## 2019-07-09 DIAGNOSIS — F90.0 ADHD, PREDOMINANTLY INATTENTIVE TYPE: ICD-10-CM

## 2019-07-09 RX ORDER — DEXTROAMPHETAMINE SACCHARATE, AMPHETAMINE ASPARTATE, DEXTROAMPHETAMINE SULFATE AND AMPHETAMINE SULFATE 5; 5; 5; 5 MG/1; MG/1; MG/1; MG/1
20 TABLET ORAL DAILY
Qty: 30 TABLET | Refills: 0 | Status: SHIPPED | OUTPATIENT
Start: 2019-09-07 | End: 2019-10-11 | Stop reason: SDUPTHER

## 2019-07-09 RX ORDER — DEXTROAMPHETAMINE SACCHARATE, AMPHETAMINE ASPARTATE MONOHYDRATE, DEXTROAMPHETAMINE SULFATE AND AMPHETAMINE SULFATE 7.5; 7.5; 7.5; 7.5 MG/1; MG/1; MG/1; MG/1
30 CAPSULE, EXTENDED RELEASE ORAL EVERY MORNING
Qty: 30 CAPSULE | Refills: 0 | Status: SHIPPED | OUTPATIENT
Start: 2019-08-18 | End: 2019-10-11 | Stop reason: SDUPTHER

## 2019-07-09 RX ORDER — DEXTROAMPHETAMINE SACCHARATE, AMPHETAMINE ASPARTATE, DEXTROAMPHETAMINE SULFATE AND AMPHETAMINE SULFATE 5; 5; 5; 5 MG/1; MG/1; MG/1; MG/1
20 TABLET ORAL DAILY
Qty: 30 TABLET | Refills: 0 | Status: SHIPPED | OUTPATIENT
Start: 2019-08-08 | End: 2019-07-10 | Stop reason: SDUPTHER

## 2019-07-09 RX ORDER — DEXTROAMPHETAMINE SACCHARATE, AMPHETAMINE ASPARTATE MONOHYDRATE, DEXTROAMPHETAMINE SULFATE AND AMPHETAMINE SULFATE 7.5; 7.5; 7.5; 7.5 MG/1; MG/1; MG/1; MG/1
30 CAPSULE, EXTENDED RELEASE ORAL EVERY MORNING
Qty: 30 CAPSULE | Refills: 0 | Status: SHIPPED | OUTPATIENT
Start: 2019-07-19 | End: 2019-11-01 | Stop reason: SDUPTHER

## 2019-07-09 RX ORDER — DEXTROAMPHETAMINE SACCHARATE, AMPHETAMINE ASPARTATE MONOHYDRATE, DEXTROAMPHETAMINE SULFATE AND AMPHETAMINE SULFATE 7.5; 7.5; 7.5; 7.5 MG/1; MG/1; MG/1; MG/1
30 CAPSULE, EXTENDED RELEASE ORAL EVERY MORNING
Qty: 30 CAPSULE | Refills: 0 | Status: SHIPPED | OUTPATIENT
Start: 2019-09-17 | End: 2019-11-01 | Stop reason: SDUPTHER

## 2019-07-09 RX ORDER — DEXTROAMPHETAMINE SACCHARATE, AMPHETAMINE ASPARTATE, DEXTROAMPHETAMINE SULFATE AND AMPHETAMINE SULFATE 5; 5; 5; 5 MG/1; MG/1; MG/1; MG/1
20 TABLET ORAL DAILY
Qty: 30 TABLET | Refills: 0 | Status: SHIPPED | OUTPATIENT
Start: 2019-07-09 | End: 2019-11-01 | Stop reason: SDUPTHER

## 2019-07-10 DIAGNOSIS — F90.0 ADHD, PREDOMINANTLY INATTENTIVE TYPE: ICD-10-CM

## 2019-07-10 RX ORDER — DEXTROAMPHETAMINE SACCHARATE, AMPHETAMINE ASPARTATE, DEXTROAMPHETAMINE SULFATE AND AMPHETAMINE SULFATE 5; 5; 5; 5 MG/1; MG/1; MG/1; MG/1
20 TABLET ORAL DAILY
Qty: 30 TABLET | Refills: 0 | Status: SHIPPED | OUTPATIENT
Start: 2019-08-08 | End: 2019-11-01 | Stop reason: SDUPTHER

## 2019-10-09 ENCOUNTER — TELEPHONE (OUTPATIENT)
Dept: PRIMARY CARE CLINIC | Age: 49
End: 2019-10-09

## 2019-10-11 DIAGNOSIS — F90.0 ADHD, PREDOMINANTLY INATTENTIVE TYPE: ICD-10-CM

## 2019-10-11 RX ORDER — DEXTROAMPHETAMINE SACCHARATE, AMPHETAMINE ASPARTATE MONOHYDRATE, DEXTROAMPHETAMINE SULFATE AND AMPHETAMINE SULFATE 7.5; 7.5; 7.5; 7.5 MG/1; MG/1; MG/1; MG/1
30 CAPSULE, EXTENDED RELEASE ORAL EVERY MORNING
Qty: 30 CAPSULE | Refills: 0 | Status: SHIPPED | OUTPATIENT
Start: 2019-10-11 | End: 2019-11-01 | Stop reason: SDUPTHER

## 2019-10-11 RX ORDER — DEXTROAMPHETAMINE SACCHARATE, AMPHETAMINE ASPARTATE, DEXTROAMPHETAMINE SULFATE AND AMPHETAMINE SULFATE 5; 5; 5; 5 MG/1; MG/1; MG/1; MG/1
20 TABLET ORAL DAILY
Qty: 30 TABLET | Refills: 0 | Status: SHIPPED | OUTPATIENT
Start: 2019-10-11 | End: 2019-11-01 | Stop reason: SDUPTHER

## 2019-11-01 ENCOUNTER — OFFICE VISIT (OUTPATIENT)
Dept: PRIMARY CARE CLINIC | Age: 49
End: 2019-11-01
Payer: COMMERCIAL

## 2019-11-01 VITALS
WEIGHT: 162 LBS | BODY MASS INDEX: 23.24 KG/M2 | SYSTOLIC BLOOD PRESSURE: 130 MMHG | DIASTOLIC BLOOD PRESSURE: 80 MMHG | HEART RATE: 87 BPM

## 2019-11-01 DIAGNOSIS — F90.0 ADHD, PREDOMINANTLY INATTENTIVE TYPE: ICD-10-CM

## 2019-11-01 PROCEDURE — 99213 OFFICE O/P EST LOW 20 MIN: CPT | Performed by: FAMILY MEDICINE

## 2019-11-01 RX ORDER — DEXTROAMPHETAMINE SACCHARATE, AMPHETAMINE ASPARTATE, DEXTROAMPHETAMINE SULFATE AND AMPHETAMINE SULFATE 5; 5; 5; 5 MG/1; MG/1; MG/1; MG/1
20 TABLET ORAL DAILY
Qty: 30 TABLET | Refills: 0 | Status: SHIPPED | OUTPATIENT
Start: 2019-11-21 | End: 2021-10-19

## 2019-11-01 RX ORDER — DEXTROAMPHETAMINE SACCHARATE, AMPHETAMINE ASPARTATE, DEXTROAMPHETAMINE SULFATE AND AMPHETAMINE SULFATE 5; 5; 5; 5 MG/1; MG/1; MG/1; MG/1
20 TABLET ORAL DAILY
Qty: 30 TABLET | Refills: 0 | Status: SHIPPED | OUTPATIENT
Start: 2020-01-20 | End: 2020-02-24 | Stop reason: SDUPTHER

## 2019-11-01 RX ORDER — DEXTROAMPHETAMINE SACCHARATE, AMPHETAMINE ASPARTATE, DEXTROAMPHETAMINE SULFATE AND AMPHETAMINE SULFATE 5; 5; 5; 5 MG/1; MG/1; MG/1; MG/1
20 TABLET ORAL DAILY
Qty: 11 TABLET | Refills: 0 | Status: SHIPPED | OUTPATIENT
Start: 2019-11-10 | End: 2020-02-24 | Stop reason: SDUPTHER

## 2019-11-01 RX ORDER — DEXTROAMPHETAMINE SACCHARATE, AMPHETAMINE ASPARTATE, DEXTROAMPHETAMINE SULFATE AND AMPHETAMINE SULFATE 5; 5; 5; 5 MG/1; MG/1; MG/1; MG/1
20 TABLET ORAL DAILY
Qty: 30 TABLET | Refills: 0 | Status: SHIPPED | OUTPATIENT
Start: 2019-12-21 | End: 2020-02-24 | Stop reason: SDUPTHER

## 2019-11-01 RX ORDER — DEXTROAMPHETAMINE SACCHARATE, AMPHETAMINE ASPARTATE MONOHYDRATE, DEXTROAMPHETAMINE SULFATE AND AMPHETAMINE SULFATE 7.5; 7.5; 7.5; 7.5 MG/1; MG/1; MG/1; MG/1
30 CAPSULE, EXTENDED RELEASE ORAL EVERY MORNING
Qty: 30 CAPSULE | Refills: 0 | Status: SHIPPED | OUTPATIENT
Start: 2019-11-21 | End: 2020-02-24 | Stop reason: SDUPTHER

## 2019-11-01 RX ORDER — DEXTROAMPHETAMINE SACCHARATE, AMPHETAMINE ASPARTATE MONOHYDRATE, DEXTROAMPHETAMINE SULFATE AND AMPHETAMINE SULFATE 7.5; 7.5; 7.5; 7.5 MG/1; MG/1; MG/1; MG/1
30 CAPSULE, EXTENDED RELEASE ORAL EVERY MORNING
Qty: 30 CAPSULE | Refills: 0 | Status: SHIPPED | OUTPATIENT
Start: 2019-12-21 | End: 2020-02-24 | Stop reason: SDUPTHER

## 2019-11-01 RX ORDER — DEXTROAMPHETAMINE SACCHARATE, AMPHETAMINE ASPARTATE MONOHYDRATE, DEXTROAMPHETAMINE SULFATE AND AMPHETAMINE SULFATE 7.5; 7.5; 7.5; 7.5 MG/1; MG/1; MG/1; MG/1
30 CAPSULE, EXTENDED RELEASE ORAL EVERY MORNING
Qty: 30 CAPSULE | Refills: 0 | Status: SHIPPED | OUTPATIENT
Start: 2020-01-20 | End: 2020-02-24 | Stop reason: SDUPTHER

## 2020-02-24 RX ORDER — DEXTROAMPHETAMINE SACCHARATE, AMPHETAMINE ASPARTATE MONOHYDRATE, DEXTROAMPHETAMINE SULFATE AND AMPHETAMINE SULFATE 7.5; 7.5; 7.5; 7.5 MG/1; MG/1; MG/1; MG/1
30 CAPSULE, EXTENDED RELEASE ORAL EVERY MORNING
Qty: 30 CAPSULE | Refills: 0 | OUTPATIENT
Start: 2020-02-24 | End: 2020-03-25

## 2020-02-24 RX ORDER — DEXTROAMPHETAMINE SACCHARATE, AMPHETAMINE ASPARTATE, DEXTROAMPHETAMINE SULFATE AND AMPHETAMINE SULFATE 5; 5; 5; 5 MG/1; MG/1; MG/1; MG/1
20 TABLET ORAL DAILY
Qty: 30 TABLET | Refills: 0 | OUTPATIENT
Start: 2020-02-24 | End: 2020-03-25

## 2020-02-24 RX ORDER — DEXTROAMPHETAMINE SACCHARATE, AMPHETAMINE ASPARTATE, DEXTROAMPHETAMINE SULFATE AND AMPHETAMINE SULFATE 5; 5; 5; 5 MG/1; MG/1; MG/1; MG/1
20 TABLET ORAL DAILY
Qty: 30 TABLET | Refills: 0 | Status: SHIPPED | OUTPATIENT
Start: 2020-04-25 | End: 2020-06-04 | Stop reason: SDUPTHER

## 2020-02-24 RX ORDER — DEXTROAMPHETAMINE SACCHARATE, AMPHETAMINE ASPARTATE MONOHYDRATE, DEXTROAMPHETAMINE SULFATE AND AMPHETAMINE SULFATE 7.5; 7.5; 7.5; 7.5 MG/1; MG/1; MG/1; MG/1
30 CAPSULE, EXTENDED RELEASE ORAL EVERY MORNING
Qty: 30 CAPSULE | Refills: 0 | Status: SHIPPED | OUTPATIENT
Start: 2020-04-25 | End: 2020-06-04 | Stop reason: SDUPTHER

## 2020-02-24 RX ORDER — DEXTROAMPHETAMINE SACCHARATE, AMPHETAMINE ASPARTATE MONOHYDRATE, DEXTROAMPHETAMINE SULFATE AND AMPHETAMINE SULFATE 7.5; 7.5; 7.5; 7.5 MG/1; MG/1; MG/1; MG/1
30 CAPSULE, EXTENDED RELEASE ORAL EVERY MORNING
Qty: 30 CAPSULE | Refills: 0 | Status: SHIPPED | OUTPATIENT
Start: 2020-02-24 | End: 2020-06-04 | Stop reason: SDUPTHER

## 2020-02-24 RX ORDER — DEXTROAMPHETAMINE SACCHARATE, AMPHETAMINE ASPARTATE, DEXTROAMPHETAMINE SULFATE AND AMPHETAMINE SULFATE 5; 5; 5; 5 MG/1; MG/1; MG/1; MG/1
20 TABLET ORAL DAILY
Qty: 30 TABLET | Refills: 0 | Status: SHIPPED | OUTPATIENT
Start: 2020-03-26 | End: 2020-06-04 | Stop reason: SDUPTHER

## 2020-02-24 RX ORDER — DEXTROAMPHETAMINE SACCHARATE, AMPHETAMINE ASPARTATE, DEXTROAMPHETAMINE SULFATE AND AMPHETAMINE SULFATE 5; 5; 5; 5 MG/1; MG/1; MG/1; MG/1
20 TABLET ORAL DAILY
Qty: 30 TABLET | Refills: 0 | Status: SHIPPED | OUTPATIENT
Start: 2020-02-24 | End: 2020-06-04 | Stop reason: SDUPTHER

## 2020-02-24 RX ORDER — DEXTROAMPHETAMINE SACCHARATE, AMPHETAMINE ASPARTATE MONOHYDRATE, DEXTROAMPHETAMINE SULFATE AND AMPHETAMINE SULFATE 7.5; 7.5; 7.5; 7.5 MG/1; MG/1; MG/1; MG/1
30 CAPSULE, EXTENDED RELEASE ORAL EVERY MORNING
Qty: 30 CAPSULE | Refills: 0 | Status: SHIPPED | OUTPATIENT
Start: 2020-03-26 | End: 2020-06-04 | Stop reason: SDUPTHER

## 2020-02-24 NOTE — TELEPHONE ENCOUNTER
Patient called stating it's time for the next 3 month supply of her following medications. Adderall 30 mg and Adderall 20 mg.    To [facility]     Thank you

## 2020-06-04 ENCOUNTER — VIRTUAL VISIT (OUTPATIENT)
Dept: PRIMARY CARE CLINIC | Age: 50
End: 2020-06-04
Payer: COMMERCIAL

## 2020-06-04 PROCEDURE — 99442 PR PHYS/QHP TELEPHONE EVALUATION 11-20 MIN: CPT | Performed by: FAMILY MEDICINE

## 2020-06-04 RX ORDER — DEXTROAMPHETAMINE SACCHARATE, AMPHETAMINE ASPARTATE, DEXTROAMPHETAMINE SULFATE AND AMPHETAMINE SULFATE 5; 5; 5; 5 MG/1; MG/1; MG/1; MG/1
20 TABLET ORAL DAILY
Qty: 30 TABLET | Refills: 0 | Status: SHIPPED | OUTPATIENT
Start: 2020-07-04 | End: 2020-10-09 | Stop reason: SDUPTHER

## 2020-06-04 RX ORDER — VARENICLINE TARTRATE 1 MG/1
1 TABLET, FILM COATED ORAL 2 TIMES DAILY
Qty: 60 TABLET | Refills: 5 | Status: SHIPPED | OUTPATIENT
Start: 2020-06-04 | End: 2022-02-08

## 2020-06-04 RX ORDER — DEXTROAMPHETAMINE SACCHARATE, AMPHETAMINE ASPARTATE, DEXTROAMPHETAMINE SULFATE AND AMPHETAMINE SULFATE 5; 5; 5; 5 MG/1; MG/1; MG/1; MG/1
20 TABLET ORAL DAILY
Qty: 30 TABLET | Refills: 0 | Status: SHIPPED | OUTPATIENT
Start: 2020-08-03 | End: 2020-09-02 | Stop reason: SDUPTHER

## 2020-06-04 RX ORDER — DEXTROAMPHETAMINE SACCHARATE, AMPHETAMINE ASPARTATE MONOHYDRATE, DEXTROAMPHETAMINE SULFATE AND AMPHETAMINE SULFATE 7.5; 7.5; 7.5; 7.5 MG/1; MG/1; MG/1; MG/1
30 CAPSULE, EXTENDED RELEASE ORAL EVERY MORNING
Qty: 30 CAPSULE | Refills: 0 | Status: SHIPPED | OUTPATIENT
Start: 2020-08-14 | End: 2020-09-02 | Stop reason: SDUPTHER

## 2020-06-04 RX ORDER — DEXTROAMPHETAMINE SACCHARATE, AMPHETAMINE ASPARTATE MONOHYDRATE, DEXTROAMPHETAMINE SULFATE AND AMPHETAMINE SULFATE 7.5; 7.5; 7.5; 7.5 MG/1; MG/1; MG/1; MG/1
30 CAPSULE, EXTENDED RELEASE ORAL EVERY MORNING
Qty: 30 CAPSULE | Refills: 0 | Status: SHIPPED | OUTPATIENT
Start: 2020-07-15 | End: 2020-10-09 | Stop reason: SDUPTHER

## 2020-06-04 RX ORDER — DEXTROAMPHETAMINE SACCHARATE, AMPHETAMINE ASPARTATE, DEXTROAMPHETAMINE SULFATE AND AMPHETAMINE SULFATE 5; 5; 5; 5 MG/1; MG/1; MG/1; MG/1
20 TABLET ORAL DAILY
Qty: 30 TABLET | Refills: 0 | Status: SHIPPED | OUTPATIENT
Start: 2020-06-04 | End: 2020-12-07 | Stop reason: SDUPTHER

## 2020-06-04 RX ORDER — DEXTROAMPHETAMINE SACCHARATE, AMPHETAMINE ASPARTATE MONOHYDRATE, DEXTROAMPHETAMINE SULFATE AND AMPHETAMINE SULFATE 7.5; 7.5; 7.5; 7.5 MG/1; MG/1; MG/1; MG/1
30 CAPSULE, EXTENDED RELEASE ORAL EVERY MORNING
Qty: 30 CAPSULE | Refills: 0 | Status: SHIPPED | OUTPATIENT
Start: 2020-06-15 | End: 2020-12-07 | Stop reason: SDUPTHER

## 2020-06-04 SDOH — ECONOMIC STABILITY: FOOD INSECURITY: WITHIN THE PAST 12 MONTHS, YOU WORRIED THAT YOUR FOOD WOULD RUN OUT BEFORE YOU GOT MONEY TO BUY MORE.: NEVER TRUE

## 2020-06-04 SDOH — ECONOMIC STABILITY: INCOME INSECURITY: HOW HARD IS IT FOR YOU TO PAY FOR THE VERY BASICS LIKE FOOD, HOUSING, MEDICAL CARE, AND HEATING?: NOT HARD AT ALL

## 2020-06-04 SDOH — ECONOMIC STABILITY: FOOD INSECURITY: WITHIN THE PAST 12 MONTHS, THE FOOD YOU BOUGHT JUST DIDN'T LAST AND YOU DIDN'T HAVE MONEY TO GET MORE.: NEVER TRUE

## 2020-06-04 ASSESSMENT — PATIENT HEALTH QUESTIONNAIRE - PHQ9
SUM OF ALL RESPONSES TO PHQ QUESTIONS 1-9: 0
SUM OF ALL RESPONSES TO PHQ9 QUESTIONS 1 & 2: 0
SUM OF ALL RESPONSES TO PHQ QUESTIONS 1-9: 0
1. LITTLE INTEREST OR PLEASURE IN DOING THINGS: 0
2. FEELING DOWN, DEPRESSED OR HOPELESS: 0

## 2020-06-04 NOTE — PROGRESS NOTES
Felisa Valentino is a 47 y.o. female evaluated via telephone on 6/4/2020. 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 no SEs , like weight loss, insomnia. Details of this discussion including any medical advice provided: Will refill the Adderall and the Chantix. C does help curb craving but upsets her stomach so she avoids it through the day      I affirm this is a Patient Initiated Episode with a Patient who has not had a related appointment within my department in the past 7 days or scheduled within the next 24 hours.     Patient identification was verified at the start of the visit: Yes    Total Time: minutes: 11-20 minutes    Note: not billable if this call serves to triage the patient into an appointment for the relevant concern      Percy Strickland

## 2020-09-02 ENCOUNTER — TELEPHONE (OUTPATIENT)
Dept: PRIMARY CARE CLINIC | Age: 50
End: 2020-09-02

## 2020-09-02 RX ORDER — DEXTROAMPHETAMINE SACCHARATE, AMPHETAMINE ASPARTATE MONOHYDRATE, DEXTROAMPHETAMINE SULFATE AND AMPHETAMINE SULFATE 7.5; 7.5; 7.5; 7.5 MG/1; MG/1; MG/1; MG/1
30 CAPSULE, EXTENDED RELEASE ORAL EVERY MORNING
Qty: 30 CAPSULE | Refills: 0 | Status: SHIPPED | OUTPATIENT
Start: 2020-09-02 | End: 2020-10-09 | Stop reason: SDUPTHER

## 2020-09-02 RX ORDER — DEXTROAMPHETAMINE SACCHARATE, AMPHETAMINE ASPARTATE, DEXTROAMPHETAMINE SULFATE AND AMPHETAMINE SULFATE 5; 5; 5; 5 MG/1; MG/1; MG/1; MG/1
20 TABLET ORAL DAILY
Qty: 30 TABLET | Refills: 0 | Status: SHIPPED | OUTPATIENT
Start: 2020-09-02 | End: 2020-10-09 | Stop reason: SDUPTHER

## 2020-10-09 ENCOUNTER — TELEPHONE (OUTPATIENT)
Dept: PRIMARY CARE CLINIC | Age: 50
End: 2020-10-09

## 2020-10-09 RX ORDER — DEXTROAMPHETAMINE SACCHARATE, AMPHETAMINE ASPARTATE MONOHYDRATE, DEXTROAMPHETAMINE SULFATE AND AMPHETAMINE SULFATE 7.5; 7.5; 7.5; 7.5 MG/1; MG/1; MG/1; MG/1
30 CAPSULE, EXTENDED RELEASE ORAL EVERY MORNING
Qty: 30 CAPSULE | Refills: 0 | Status: SHIPPED | OUTPATIENT
Start: 2020-10-18 | End: 2020-12-07 | Stop reason: SDUPTHER

## 2020-10-09 RX ORDER — DEXTROAMPHETAMINE SACCHARATE, AMPHETAMINE ASPARTATE MONOHYDRATE, DEXTROAMPHETAMINE SULFATE AND AMPHETAMINE SULFATE 7.5; 7.5; 7.5; 7.5 MG/1; MG/1; MG/1; MG/1
30 CAPSULE, EXTENDED RELEASE ORAL EVERY MORNING
Qty: 30 CAPSULE | Refills: 0 | Status: SHIPPED | OUTPATIENT
Start: 2020-11-17 | End: 2020-12-07 | Stop reason: SDUPTHER

## 2020-10-09 RX ORDER — DEXTROAMPHETAMINE SACCHARATE, AMPHETAMINE ASPARTATE, DEXTROAMPHETAMINE SULFATE AND AMPHETAMINE SULFATE 5; 5; 5; 5 MG/1; MG/1; MG/1; MG/1
20 TABLET ORAL DAILY
Qty: 30 TABLET | Refills: 0 | Status: SHIPPED | OUTPATIENT
Start: 2020-11-08 | End: 2020-12-07 | Stop reason: SDUPTHER

## 2020-10-09 RX ORDER — DEXTROAMPHETAMINE SACCHARATE, AMPHETAMINE ASPARTATE, DEXTROAMPHETAMINE SULFATE AND AMPHETAMINE SULFATE 5; 5; 5; 5 MG/1; MG/1; MG/1; MG/1
20 TABLET ORAL DAILY
Qty: 30 TABLET | Refills: 0 | Status: SHIPPED | OUTPATIENT
Start: 2020-10-09 | End: 2020-12-07 | Stop reason: SDUPTHER

## 2020-10-09 NOTE — TELEPHONE ENCOUNTER
Pt states she is out of her amphetamine-dextroamphetamine (ADDERALL, 20MG,) 20 MG tablet   She does have some 30 mg left but she is out of 20   She also states she normally get 3 pre auth script at a time   Also pt states per her conversation with oumar he wanted her to be seen every 6 month so she is not due until November since her last visit   Please advise   Pt would like rfill send to Pharmacy:  [facility], [address] [place] [address] [phone]

## 2020-10-09 NOTE — TELEPHONE ENCOUNTER
Ask her if she would like me to call in enough 20 milligrams Adderall's so that they run out on the same day she is going to run out of the 30 mg XR. That way she could fill the prescriptions at the same time in the future. Her last visit, a virtual 1, was June 4, so she can wait to make an appointment until the first week of December.

## 2020-10-09 NOTE — TELEPHONE ENCOUNTER
She does not want them to be rx'd at the same time. She says that you have tried that several times before and it always gets messed up anyway at the pharm.  They cannot seem to keep the 30mg in stock

## 2020-12-07 ENCOUNTER — VIRTUAL VISIT (OUTPATIENT)
Dept: PRIMARY CARE CLINIC | Age: 50
End: 2020-12-07
Payer: COMMERCIAL

## 2020-12-07 PROCEDURE — 99213 OFFICE O/P EST LOW 20 MIN: CPT | Performed by: FAMILY MEDICINE

## 2020-12-07 RX ORDER — DEXTROAMPHETAMINE SACCHARATE, AMPHETAMINE ASPARTATE, DEXTROAMPHETAMINE SULFATE AND AMPHETAMINE SULFATE 5; 5; 5; 5 MG/1; MG/1; MG/1; MG/1
20 TABLET ORAL DAILY
Qty: 30 TABLET | Refills: 0 | Status: SHIPPED | OUTPATIENT
Start: 2020-12-10 | End: 2020-12-11 | Stop reason: SDUPTHER

## 2020-12-07 RX ORDER — DEXTROAMPHETAMINE SACCHARATE, AMPHETAMINE ASPARTATE MONOHYDRATE, DEXTROAMPHETAMINE SULFATE AND AMPHETAMINE SULFATE 7.5; 7.5; 7.5; 7.5 MG/1; MG/1; MG/1; MG/1
30 CAPSULE, EXTENDED RELEASE ORAL EVERY MORNING
Qty: 30 CAPSULE | Refills: 0 | Status: SHIPPED | OUTPATIENT
Start: 2021-01-19 | End: 2021-03-16 | Stop reason: SDUPTHER

## 2020-12-07 RX ORDER — DEXTROAMPHETAMINE SACCHARATE, AMPHETAMINE ASPARTATE, DEXTROAMPHETAMINE SULFATE AND AMPHETAMINE SULFATE 5; 5; 5; 5 MG/1; MG/1; MG/1; MG/1
20 TABLET ORAL DAILY
Qty: 30 TABLET | Refills: 0 | Status: SHIPPED | OUTPATIENT
Start: 2021-02-08 | End: 2021-03-16 | Stop reason: SDUPTHER

## 2020-12-07 RX ORDER — DEXTROAMPHETAMINE SACCHARATE, AMPHETAMINE ASPARTATE MONOHYDRATE, DEXTROAMPHETAMINE SULFATE AND AMPHETAMINE SULFATE 7.5; 7.5; 7.5; 7.5 MG/1; MG/1; MG/1; MG/1
30 CAPSULE, EXTENDED RELEASE ORAL EVERY MORNING
Qty: 30 CAPSULE | Refills: 0 | Status: SHIPPED | OUTPATIENT
Start: 2020-12-20 | End: 2021-03-16 | Stop reason: SDUPTHER

## 2020-12-07 RX ORDER — DEXTROAMPHETAMINE SACCHARATE, AMPHETAMINE ASPARTATE, DEXTROAMPHETAMINE SULFATE AND AMPHETAMINE SULFATE 5; 5; 5; 5 MG/1; MG/1; MG/1; MG/1
20 TABLET ORAL DAILY
Qty: 30 TABLET | Refills: 0 | Status: SHIPPED | OUTPATIENT
Start: 2021-01-09 | End: 2021-03-16 | Stop reason: SDUPTHER

## 2020-12-07 RX ORDER — DEXTROAMPHETAMINE SACCHARATE, AMPHETAMINE ASPARTATE MONOHYDRATE, DEXTROAMPHETAMINE SULFATE AND AMPHETAMINE SULFATE 7.5; 7.5; 7.5; 7.5 MG/1; MG/1; MG/1; MG/1
30 CAPSULE, EXTENDED RELEASE ORAL EVERY MORNING
Qty: 30 CAPSULE | Refills: 0 | Status: SHIPPED | OUTPATIENT
Start: 2021-02-18 | End: 2021-03-16 | Stop reason: SDUPTHER

## 2020-12-07 NOTE — PROGRESS NOTES
Tobacco comment: quit but re started   Substance Use Topics    Alcohol use: Yes     Alcohol/week: 0.0 standard drinks     Comment: wine once /week    Drug use: Not on file            PHYSICAL EXAMINATION:  [ INSTRUCTIONS:  \"[x]\" Indicates a positive item  \"[]\" Indicates a negative item  -- DELETE ALL ITEMS NOT EXAMINED]  Vital Signs: (As obtained by patient/caregiver or practitioner observation)    Blood pressure-  Heart rate-    Respiratory rate-    Temperature-  Pulse oximetry-     Constitutional: [x] Appears well-developed and well-nourished [x] No apparent distress      [] Abnormal-   Mental status  [x] Alert and awake  [x] Oriented to person/place/time [x]Able to follow commands      Eyes:  EOM    []  Normal  [] Abnormal-  Sclera  []  Normal  [] Abnormal -         Discharge []  None visible  [] Abnormal -    HENT:   [] Normocephalic, atraumatic. [] Abnormal   [] Mouth/Throat: Mucous membranes are moist.     External Ears [] Normal  [] Abnormal-     Neck: [x] No visualized mass     Pulmonary/Chest: [x] Respiratory effort normal.  [] No visualized signs of difficulty breathing or respiratory distress        [] Abnormal-      Musculoskeletal:   [] Normal gait with no signs of ataxia         [] Normal range of motion of neck        [] Abnormal-       Neurological:        [] No Facial Asymmetry (Cranial nerve 7 motor function) (limited exam to video visit)          [] No gaze palsy        [] Abnormal-         Skin:        [] No significant exanthematous lesions or discoloration noted on facial skin         [] Abnormal-            Psychiatric:       [x] Normal Affect [x] No Hallucinations        [] Abnormal-     Other pertinent observable physical exam findings-     ASSESSMENT/PLAN:  ADHD, inattentive type  Recovering from tobacco abuse    P: Reorder the Adderall   Return in 6 mos  Digna Justice is a 45 y.o. female being evaluated by a Virtual Visit (video visit) encounter to address concerns as mentioned above. A caregiver was present when appropriate. Due to this being a TeleHealth encounter (During BRMOB-95 public health emergency), evaluation of the following organ systems was limited: Vitals/Constitutional/EENT/Resp/CV/GI//MS/Neuro/Skin/Heme-Lymph-Imm. Pursuant to the emergency declaration under the [address] [place] [address], [address] [place] [address] and the [employer] Act, this Virtual Visit was conducted with patient's (and/or legal guardian's) consent, to reduce the patient's risk of exposure to COVID-19 and provide necessary medical care. The patient (and/or legal guardian) has also been advised to contact this office for worsening conditions or problems, and seek emergency medical treatment and/or call 911 if deemed necessary. Patient identification was verified at the start of the visit: Yes    Total time spent on this encounter: Not billed by time    Services were provided through a video synchronous discussion virtually to substitute for in-person clinic visit. Patient and provider were located at their individual homes. --Ileana Benson MD on 12/7/2020 at 2:38 PM    An electronic signature was used to authenticate this note.

## 2020-12-11 RX ORDER — DEXTROAMPHETAMINE SACCHARATE, AMPHETAMINE ASPARTATE, DEXTROAMPHETAMINE SULFATE AND AMPHETAMINE SULFATE 5; 5; 5; 5 MG/1; MG/1; MG/1; MG/1
20 TABLET ORAL DAILY
Qty: 30 TABLET | Refills: 0 | OUTPATIENT
Start: 2021-02-08 | End: 2021-03-10

## 2020-12-11 RX ORDER — DEXTROAMPHETAMINE SACCHARATE, AMPHETAMINE ASPARTATE, DEXTROAMPHETAMINE SULFATE AND AMPHETAMINE SULFATE 5; 5; 5; 5 MG/1; MG/1; MG/1; MG/1
20 TABLET ORAL DAILY
Qty: 30 TABLET | Refills: 0 | Status: SHIPPED | OUTPATIENT
Start: 2020-12-11 | End: 2021-03-16 | Stop reason: SDUPTHER

## 2020-12-11 NOTE — TELEPHONE ENCOUNTER
Pt states she went to  her amphetamine-dextroamphetamine (ADDERALL, 20MG,) 20 MG tablet @ [facility] in [facility]   But unfortunately they do not have the medication until the next 30 days   [facility] [place] [facility] [address] [phone] [facility] the prescription but they need it transfer to there location.  Please review

## 2021-03-16 DIAGNOSIS — F90.0 ADHD, PREDOMINANTLY INATTENTIVE TYPE: ICD-10-CM

## 2021-03-16 RX ORDER — DEXTROAMPHETAMINE SACCHARATE, AMPHETAMINE ASPARTATE, DEXTROAMPHETAMINE SULFATE AND AMPHETAMINE SULFATE 5; 5; 5; 5 MG/1; MG/1; MG/1; MG/1
20 TABLET ORAL DAILY
Qty: 30 TABLET | Refills: 0 | Status: SHIPPED | OUTPATIENT
Start: 2021-04-16 | End: 2021-06-25 | Stop reason: SDUPTHER

## 2021-03-16 RX ORDER — DEXTROAMPHETAMINE SACCHARATE, AMPHETAMINE ASPARTATE MONOHYDRATE, DEXTROAMPHETAMINE SULFATE AND AMPHETAMINE SULFATE 7.5; 7.5; 7.5; 7.5 MG/1; MG/1; MG/1; MG/1
30 CAPSULE, EXTENDED RELEASE ORAL EVERY MORNING
Qty: 30 CAPSULE | Refills: 0 | Status: SHIPPED | OUTPATIENT
Start: 2021-03-26 | End: 2021-06-25 | Stop reason: SDUPTHER

## 2021-03-16 RX ORDER — DEXTROAMPHETAMINE SACCHARATE, AMPHETAMINE ASPARTATE, DEXTROAMPHETAMINE SULFATE AND AMPHETAMINE SULFATE 5; 5; 5; 5 MG/1; MG/1; MG/1; MG/1
20 TABLET ORAL DAILY
Qty: 30 TABLET | Refills: 0 | Status: SHIPPED | OUTPATIENT
Start: 2021-05-16 | End: 2021-06-25 | Stop reason: SDUPTHER

## 2021-03-16 RX ORDER — DEXTROAMPHETAMINE SACCHARATE, AMPHETAMINE ASPARTATE, DEXTROAMPHETAMINE SULFATE AND AMPHETAMINE SULFATE 5; 5; 5; 5 MG/1; MG/1; MG/1; MG/1
20 TABLET ORAL DAILY
Qty: 30 TABLET | Refills: 0 | Status: CANCELLED | OUTPATIENT
Start: 2021-03-16 | End: 2021-04-15

## 2021-03-16 RX ORDER — DEXTROAMPHETAMINE SACCHARATE, AMPHETAMINE ASPARTATE MONOHYDRATE, DEXTROAMPHETAMINE SULFATE AND AMPHETAMINE SULFATE 7.5; 7.5; 7.5; 7.5 MG/1; MG/1; MG/1; MG/1
30 CAPSULE, EXTENDED RELEASE ORAL EVERY MORNING
Qty: 30 CAPSULE | Refills: 0 | Status: SHIPPED | OUTPATIENT
Start: 2021-04-25 | End: 2021-10-19

## 2021-03-16 RX ORDER — DEXTROAMPHETAMINE SACCHARATE, AMPHETAMINE ASPARTATE MONOHYDRATE, DEXTROAMPHETAMINE SULFATE AND AMPHETAMINE SULFATE 7.5; 7.5; 7.5; 7.5 MG/1; MG/1; MG/1; MG/1
30 CAPSULE, EXTENDED RELEASE ORAL EVERY MORNING
Qty: 30 CAPSULE | Refills: 0 | Status: SHIPPED | OUTPATIENT
Start: 2021-05-25 | End: 2021-06-25 | Stop reason: SDUPTHER

## 2021-03-16 RX ORDER — DEXTROAMPHETAMINE SACCHARATE, AMPHETAMINE ASPARTATE, DEXTROAMPHETAMINE SULFATE AND AMPHETAMINE SULFATE 5; 5; 5; 5 MG/1; MG/1; MG/1; MG/1
20 TABLET ORAL DAILY
Qty: 30 TABLET | Refills: 0 | Status: SHIPPED | OUTPATIENT
Start: 2021-03-16 | End: 2021-10-19

## 2021-03-16 NOTE — TELEPHONE ENCOUNTER
amphetamine-dextroamphetamine (ADDERALL, 20MG,) 20 MG tablet     Take 1 tablet by mouth daily for 30 days    [id number] [place] [place] [facility] [address] [address] [facility] [address] [facility] [phone]   [place] [id number]   Phone:  [phone]  Fax:  [phone]    amphetamine-dextroamphetamine (ADDERALL XR) 30 MG extended release capsule     Take 1 capsule by mouth every morning for 30 days    [id number] [place] [facility] [address] [address] [facility] [address] [place] [address] [phone]   [address] [place] [address], [facility] [address]   Phone:  [phone]  Fax:  [phone]    LOV 12/7/20    Needs 3 mos supply

## 2021-03-16 NOTE — TELEPHONE ENCOUNTER
Medication:   Requested Prescriptions     Pending Prescriptions Disp Refills    amphetamine-dextroamphetamine (ADDERALL, 20MG,) 20 MG tablet 30 tablet 0     Sig: Take 1 tablet by mouth daily for 30 days. Last Filled:  12.11.20    Last appt: 12/7/2020   Next appt: Visit date not found    Last OARRS:   RX Monitoring 3/11/2019   Attestation The Prescription Monitoring Report for this patient was reviewed today.    Periodic Controlled Substance Monitoring No signs of potential drug abuse or diversion identified: otherwise, see note documentation

## 2021-06-25 ENCOUNTER — OFFICE VISIT (OUTPATIENT)
Dept: PRIMARY CARE CLINIC | Age: 51
End: 2021-06-25
Payer: COMMERCIAL

## 2021-06-25 VITALS
DIASTOLIC BLOOD PRESSURE: 80 MMHG | HEART RATE: 75 BPM | HEIGHT: 71 IN | OXYGEN SATURATION: 98 % | SYSTOLIC BLOOD PRESSURE: 125 MMHG | TEMPERATURE: 97.5 F | WEIGHT: 171.8 LBS | BODY MASS INDEX: 24.05 KG/M2

## 2021-06-25 DIAGNOSIS — F90.0 ADHD, PREDOMINANTLY INATTENTIVE TYPE: ICD-10-CM

## 2021-06-25 PROCEDURE — 99213 OFFICE O/P EST LOW 20 MIN: CPT | Performed by: FAMILY MEDICINE

## 2021-06-25 RX ORDER — DEXTROAMPHETAMINE SACCHARATE, AMPHETAMINE ASPARTATE, DEXTROAMPHETAMINE SULFATE AND AMPHETAMINE SULFATE 5; 5; 5; 5 MG/1; MG/1; MG/1; MG/1
20 TABLET ORAL DAILY
Qty: 30 TABLET | Refills: 0 | Status: SHIPPED | OUTPATIENT
Start: 2021-06-25 | End: 2021-10-19

## 2021-06-25 RX ORDER — DEXTROAMPHETAMINE SACCHARATE, AMPHETAMINE ASPARTATE MONOHYDRATE, DEXTROAMPHETAMINE SULFATE AND AMPHETAMINE SULFATE 7.5; 7.5; 7.5; 7.5 MG/1; MG/1; MG/1; MG/1
30 CAPSULE, EXTENDED RELEASE ORAL EVERY MORNING
Qty: 90 CAPSULE | Refills: 0 | Status: SHIPPED | OUTPATIENT
Start: 2021-07-27 | End: 2021-10-19 | Stop reason: SDUPTHER

## 2021-06-25 RX ORDER — DEXTROAMPHETAMINE SACCHARATE, AMPHETAMINE ASPARTATE, DEXTROAMPHETAMINE SULFATE AND AMPHETAMINE SULFATE 5; 5; 5; 5 MG/1; MG/1; MG/1; MG/1
20 TABLET ORAL DAILY
Qty: 90 TABLET | Refills: 0 | Status: SHIPPED | OUTPATIENT
Start: 2021-07-25 | End: 2021-10-19

## 2021-06-25 RX ORDER — DEXTROAMPHETAMINE SACCHARATE, AMPHETAMINE ASPARTATE MONOHYDRATE, DEXTROAMPHETAMINE SULFATE AND AMPHETAMINE SULFATE 7.5; 7.5; 7.5; 7.5 MG/1; MG/1; MG/1; MG/1
30 CAPSULE, EXTENDED RELEASE ORAL EVERY MORNING
Qty: 30 CAPSULE | Refills: 0 | Status: SHIPPED | OUTPATIENT
Start: 2021-06-27 | End: 2021-10-19

## 2021-06-25 SDOH — ECONOMIC STABILITY: FOOD INSECURITY: WITHIN THE PAST 12 MONTHS, YOU WORRIED THAT YOUR FOOD WOULD RUN OUT BEFORE YOU GOT MONEY TO BUY MORE.: NEVER TRUE

## 2021-06-25 SDOH — ECONOMIC STABILITY: FOOD INSECURITY: WITHIN THE PAST 12 MONTHS, THE FOOD YOU BOUGHT JUST DIDN'T LAST AND YOU DIDN'T HAVE MONEY TO GET MORE.: NEVER TRUE

## 2021-06-25 ASSESSMENT — PATIENT HEALTH QUESTIONNAIRE - PHQ9
SUM OF ALL RESPONSES TO PHQ QUESTIONS 1-9: 0
1. LITTLE INTEREST OR PLEASURE IN DOING THINGS: 0
SUM OF ALL RESPONSES TO PHQ9 QUESTIONS 1 & 2: 0
2. FEELING DOWN, DEPRESSED OR HOPELESS: 0

## 2021-06-25 ASSESSMENT — SOCIAL DETERMINANTS OF HEALTH (SDOH): HOW HARD IS IT FOR YOU TO PAY FOR THE VERY BASICS LIKE FOOD, HOUSING, MEDICAL CARE, AND HEATING?: NOT HARD AT ALL

## 2021-06-25 NOTE — PROGRESS NOTES
Dinorah Higgins (:  1970) is a 45 y.o. female,Established patient, here for evaluation of the following chief complaint(s):  6 Month Follow-Up (medication check)         ASSESSMENT/PLAN:  ADHD controlled    Refill Adderall         Subjective   SUBJECTIVE/OBJECTIVE:  HPITakes the plain Adderall in the afternoon around 3 pm, and that will take her through the workday    No SEs    Better concentration and completing more task on the med    Review of Systems       Objective   Physical Exam  Cardiovascular:      Rate and Rhythm: Normal rate and regular rhythm. Pulmonary:      Effort: Pulmonary effort is normal.      Breath sounds: Normal breath sounds. Psychiatric:         Mood and Affect: Mood normal.         Behavior: Behavior normal.            Vitals:    21 1029   BP: 125/80   Site: Right Upper Arm   Position: Sitting   Cuff Size: Medium Adult   Pulse: 75   Temp: 97.5 °F (36.4 °C)   TempSrc: Oral   SpO2: 98%   Weight: 171 lb 12.8 oz (77.9 kg)   Height: 5' 10.8\" (1.798 m)         An electronic signature was used to authenticate this note.     --Renetta Dobbs MD

## 2021-10-19 ENCOUNTER — OFFICE VISIT (OUTPATIENT)
Dept: PRIMARY CARE CLINIC | Age: 51
End: 2021-10-19
Payer: COMMERCIAL

## 2021-10-19 VITALS
TEMPERATURE: 98.5 F | WEIGHT: 170.2 LBS | DIASTOLIC BLOOD PRESSURE: 84 MMHG | BODY MASS INDEX: 23.87 KG/M2 | SYSTOLIC BLOOD PRESSURE: 118 MMHG

## 2021-10-19 DIAGNOSIS — Z00.00 ENCOUNTER FOR ANNUAL PHYSICAL EXAM: Primary | ICD-10-CM

## 2021-10-19 DIAGNOSIS — Z23 NEED FOR PNEUMOCOCCAL VACCINE: ICD-10-CM

## 2021-10-19 DIAGNOSIS — Z23 NEED FOR SHINGLES VACCINE: ICD-10-CM

## 2021-10-19 DIAGNOSIS — Z12.11 COLON CANCER SCREENING: ICD-10-CM

## 2021-10-19 DIAGNOSIS — F90.0 ADHD, PREDOMINANTLY INATTENTIVE TYPE: ICD-10-CM

## 2021-10-19 PROCEDURE — 90471 IMMUNIZATION ADMIN: CPT | Performed by: STUDENT IN AN ORGANIZED HEALTH CARE EDUCATION/TRAINING PROGRAM

## 2021-10-19 PROCEDURE — 99396 PREV VISIT EST AGE 40-64: CPT | Performed by: STUDENT IN AN ORGANIZED HEALTH CARE EDUCATION/TRAINING PROGRAM

## 2021-10-19 PROCEDURE — 90750 HZV VACC RECOMBINANT IM: CPT | Performed by: STUDENT IN AN ORGANIZED HEALTH CARE EDUCATION/TRAINING PROGRAM

## 2021-10-19 PROCEDURE — 90472 IMMUNIZATION ADMIN EACH ADD: CPT | Performed by: STUDENT IN AN ORGANIZED HEALTH CARE EDUCATION/TRAINING PROGRAM

## 2021-10-19 PROCEDURE — 90732 PPSV23 VACC 2 YRS+ SUBQ/IM: CPT | Performed by: STUDENT IN AN ORGANIZED HEALTH CARE EDUCATION/TRAINING PROGRAM

## 2021-10-19 RX ORDER — DEXTROAMPHETAMINE SACCHARATE, AMPHETAMINE ASPARTATE, DEXTROAMPHETAMINE SULFATE AND AMPHETAMINE SULFATE 5; 5; 5; 5 MG/1; MG/1; MG/1; MG/1
20 TABLET ORAL DAILY
Qty: 90 TABLET | Refills: 0 | Status: SHIPPED | OUTPATIENT
Start: 2021-10-19 | End: 2022-02-07 | Stop reason: SDUPTHER

## 2021-10-19 RX ORDER — BUPROPION HYDROCHLORIDE 150 MG/1
150 TABLET ORAL EVERY MORNING
Qty: 30 TABLET | Refills: 0 | Status: SHIPPED | OUTPATIENT
Start: 2021-10-19 | End: 2021-11-22

## 2021-10-19 RX ORDER — DEXTROAMPHETAMINE SACCHARATE, AMPHETAMINE ASPARTATE MONOHYDRATE, DEXTROAMPHETAMINE SULFATE AND AMPHETAMINE SULFATE 7.5; 7.5; 7.5; 7.5 MG/1; MG/1; MG/1; MG/1
30 CAPSULE, EXTENDED RELEASE ORAL EVERY MORNING
Qty: 90 CAPSULE | Refills: 0 | Status: SHIPPED | OUTPATIENT
Start: 2021-10-19 | End: 2022-02-07 | Stop reason: SDUPTHER

## 2021-10-19 RX ORDER — NICOTINE 21 MG/24HR
1 PATCH, TRANSDERMAL 24 HOURS TRANSDERMAL EVERY 24 HOURS
Qty: 30 PATCH | Refills: 0 | Status: SHIPPED | OUTPATIENT
Start: 2021-10-19 | End: 2022-10-18

## 2021-10-19 NOTE — PROGRESS NOTES
Judit Vicente (:  1970) is a 45 y.o. female,{New vs Established:[id number]::\"Established patient\"}, here for evaluation of the following chief complaint(s):  ADHD and Establish Care         ASSESSMENT/PLAN:  {There are no diagnoses linked to this encounter. (Refresh or delete this SmartLink)}    No follow-ups on file. Subjective   SUBJECTIVE/OBJECTIVE:  HPI    ADHD  Dx in college, was on meds but then stopped once she started having childeren  12 years ago restarted meds due to not being able to concentrate. Adderall XR 30 mg daily and Adderall 20 mg IR in the afternoon around 2pm  No history of anxiety and depression. No chest pain, or SHORTNESS OF BREATH  Last dose of addreall today. Smoking 1/2 pk day,since    Rare alcohol, no drugs     Mammograms, used to get them [id number] qdue to densityu in left breast and now due again,  Colonoscopy has not sone yet no fam history of colon cancer, no sx's. Diet     Review of Systems       Objective   Physical Exam       {Time Documentation Optional:[id number]}      An electronic signature was used to authenticate this note.     --Gabriella Martínez MD

## 2021-10-19 NOTE — LETTER
CONTROLLED SUBSTANCE MEDICATION AGREEMENT     Patient Name: Lydia Atwood  Patient YOB: 1970   I understand, that controlled substance medications may be used to help better manage my symptoms and to improve my ability to function at home, work and in social settings. However, I also understand that these medications do have risks, which have been discussed with me, including possible development of physical or psychological dependence. I understand that successful treatment requires mutual trust and honesty between me and my provider. I understand and agree that following this Medication Agreement is necessary in continuing my provider-patient relationship and the success of my treatment plan. Explanation from my Provider: Benefits and Goals of Controlled Substance Medications: There are two potential goals for your treatment: (1) decreased pain and suffering (2) improved daily life functions. There are many possible treatments for your chronic condition(s). Alternatives such as physical therapy, yoga, massage, home daily exercise, meditation, relaxation techniques, injections, chiropractic manipulations, surgery, cognitive therapy, hypnosis and many medications that are not habit-forming may be used. Use of controlled substance medications may be helpful, but they are unlikely to resolve all symptoms or restore all function. Explanation from my Provider: Risks of Controlled Substance Medications:  Opioid pain medications: These medications can lead to problems such as addiction/dependence, sedation, lightheadedness/dizziness, memory issues, falls, constipation, nausea, or vomiting. They may also impair the ability to drive or operate machinery. Additionally, these medications may lower testosterone levels, leading to loss of bone strength, stamina and sex drive.   They may cause problems with breathing, sleep apnea and reduced coughing, which is especially dangerous for patients with lung disease. Overdose or dangerous interactions with alcohol and other medications may occur, leading to death. Hyperalgesia may develop, which means patients receiving opioids for the treatment of pain may become more sensitive to certain painful stimuli, and in some cases, experience pain from ordinarily non-painful stimuli. Women between the ages of 14-53 who could become pregnant should carefully weigh the risks and benefits of opioids with their physicians, as these medications increase the risk of pregnancy complications, including miscarriage,  delivery and stillbirth. It is also possible for babies to be born addicted to opioids. Opioid dependence withdrawal symptoms may include; feelings of uneasiness, increased pain, irritability, belly pain, diarrhea, sweats and goose-flesh. Benzodiazepines and non-benzodiazepine sleep medications: These medications can lead to problems such as addiction/dependence, sedation, fatigue, lightheadedness, dizziness, incoordination, falls, depression, hallucinations, and impaired judgment, memory and concentration. The ability to drive and operate machinery may also be affected. Abnormal sleep-related behaviors have been reported, including sleepwalking, driving, making telephone calls, eating, or having sex while not fully awake. These medications can suppress breathing and worsen sleep apnea, particularly when combined with alcohol or other sedating medications, potentially leading to death. Dependence withdrawal symptoms may include tremors, anxiety, hallucinations and seizures. Stimulants:  Common adverse effects include addiction/dependence, increased blood  pressure and heart rate, decreased appetite, nausea, involuntary weight loss, insomnia,                                                                                                                     Initials:_______   irritability, and headaches.   These risks may increase when these medications are combined with other stimulants, such as caffeine pills or energy drinks, certain weight loss supplements and oral decongestants. Dependence withdrawal symptoms may include depressed mood, loss of interest, suicidal thoughts, anxiety, fatigue, appetite changes and agitation. Testosterone replacement therapy:  Potential side effects include increased risk of stroke and heart attack, blood clots, increased blood pressure, increased cholesterol, enlarged prostate, sleep apnea, irritability/aggression and other mood disorders, and decreased fertility. I agree and understand that I and my prescriber have the following rights and responsibilities regarding my treatment plan:     1. MY RIGHTS:  To be informed of my treatment and medication plan. To be an active participant in my health and wellbeing. 2. MY RESPONSIBILITY AND UNDERSTANDING FOR USE OF MEDICATIONS   I will take medications at the dose and frequency as directed. For my safety, I will not increase or change how I take my medications without the recommendation of my healthcare provider.  I will actively participate in any program recommended by my provider which may improve function, including social, physical, psychological programs.  I will not take my medications with alcohol or other drugs not prescribed to me. I understand that drinking alcohol with my medications increases the chances of side effects, including reduced breathing rate and could lead to personal injury when operating machinery.  I understand that if I have a history of substance use disorders, including alcohol or other illicit drugs, that I may be at increased risk of addiction to my medications.  I agree to notify my provider immediately if I should become pregnant so that my treatment plan can be adjusted.    I agree and understand that I shall only receive controlled substance medications from the prescriber that signed this agreement unless there is written agreement among other prescribers of controlled substances outlining the responsibility of the medications being prescribed.  I understand that the if the controlled medication is not helping to achieve goals, the dosage may be tapered and no longer prescribed. 3. MY RESPONSIBILITY FOR COMMUNICATION / PRESCRIPTION RENEWALS   I agree that all controlled substance medications that I take will be prescribed only by my provider. If another healthcare provider prescribes me medication in an emergency, I will notify my provider within seventy-two (72) hours.  I will arrange for refills at the prescribed interval ONLY during regular office hours. I will not ask for refills earlier than agreed, after-hours, on holidays or weekends. Refills may take up to 72 hours for processing and prescriptions to reach the pharmacy.  I will inform my other health care providers that I am taking these medications and of the existence of this [id number]. In the event of an emergency, I will provide the same information to the emergency department prescribers.  I will keep my provider updated on the pharmacy I am using for controlled medication prescription filling. Initials:_______  4. MY RESPONSIBILITY FOR PROTECTING MEDICATIONS   I will protect my prescriptions and medications. I understand that lost or misplaced prescriptions will not be replaced.  I will keep medications only for my own use and will not share them with others. I will keep all medications away from children.  I agree that if my medications are adjusted or discontinued, I will properly dispose of any remaining medications. I understand that I will be required to dispose of any remaining controlled medications as, directed by my prescriber, prior to being provided with any prescriptions for other controlled medications.   Medication drop box locations can be found at: [email or web address]    5. MY RESPONSIBILITY WITH ILLEGAL DRUGS    I will not use illegal or street drugs or another person's prescription medications not prescribed to me.  If there are identified addiction type symptoms, then referral to a program may be provided by my provider and I agree to follow through with this recommendation. 6. MY RESPONSIBILITY FOR COOPERATION WITH INVESTIGATIONS   I understand that my provider will comply with any applicable law and may discuss my use and/or possible misuse/abuse of controlled substances and alcohol, as appropriate, with any health care provider involved in my care, pharmacist, or legal authority.  I authorize my provider and pharmacy to cooperate fully with law enforcement agencies (as permitted by law) in the investigation of any possible misuse, sale, or other diversion of my controlled substances.  I agree to waive any applicable privilege or right of privacy or confidentiality with respect to these authorizations. 7. PROVIDERS RIGHT TO MONITOR FOR SAFETY: PRESCRIPTION MONITORING / DRUG TESTING   I consent to drug/toxicology screening and will submit to a drug screen upon my providers request to assure I am only taking the prescribed drugs for my safety monitoring. I understand that a drug screen is a laboratory test in which a sample of my urine, blood or saliva is checked to see what drugs I have been taking. This may entail an observed urine specimen, which means that a nurse or other health care provider may watch me provide urine, and I will cooperate if I am asked to provide an observed specimen.  I understand that my provider will check a copy of my State Prescription Monitoring Program () Report in order to safely prescribe medications.  Pill Counts: I consent to pill counts when requested.   I may be asked to bring all my prescribed controlled substance medications, in their original bottles, to all of my scheduled appointments. In addition, my provider may ask me to come to the practice at any time for a random pill count. 8. TERMINATION OF THIS AGREEMENT  For my safety, my prescriber has the right to stop prescribing controlled substance medications and may end this agreement. Initials:_______   Conditions that may result in termination of this agreement:  a. I do not show any improvement in pain, or my activity has not improved. b. I develop rapid tolerance or loss of improvement, as described in my treatment plan.  c. I develop significant side effects from the medication. d. My behavior is not consistent with the responsibilities outlined above, thereby causing safety concerns to continue prescribing controlled substance medications. e. I fail to follow the terms of this agreement. f. Other:____________________________       UNDERSTANDING THIS MEDICATION AGREEMENT:    I have read the above and have had all my questions answered. For chronic disease management, I know that my symptoms can be managed with many types of treatments. A chronic medication trial may be part of my treatment, but I must be an active participant in my care. Medication therapy is only one part of my symptom management plan. In some cases, there may be limited scientific evidence to support the chronic use of certain medications to improve symptoms and daily function. Furthermore, in certain circumstances, there may be scientific information that suggests that the use of chronic controlled substances may worsen my symptoms and increase my risk of unintentional death directly related to this medication therapy. I know that if my provider feels my risk from controlled medications is greater than my benefit, I will have my controlled substance medication(s) compassionately lowered or removed altogether.      I further agree to allow this office to contact my HIPAA contact if there are concerns about my safety and use of the controlled medications. I have agreed to use the prescribed controlled substance medications to me as instructed by my provider and as stated in this Medication Agreement. My initial on each page and my signature below shows that I have read each page and I have had the opportunity to ask questions with answers provided by my provider.     Patient Name (Printed): _____________________________________  Patient Signature:  ______________________   Date: _____________    Prescriber Name (Printed): ___________________________________  Prescriber Signature: _____________________  Date: _____________

## 2021-10-19 NOTE — PROGRESS NOTES
10/19/2021    Maribel Barrett (:  1970) is a 45 y.o. female, here for a preventive medicine evaluation. Patient Active Problem List   Diagnosis    ADHD, predominantly inattentive type    Sciatica of right side    Tinnitus of left ear    TMJ dysfunction    Post laminectomy syndrome    Lumbosacral radiculitis    Pain syndrome, chronic    Chronic pain due to trauma     ADHD  Dx in college, was on meds but then stopped once she started having childeren  12 years ago restarted meds due to not being able to concentrate. Adderall XR 30 mg daily and Adderall 20 mg IR in the afternoon around 2pm  No history of anxiety and depression. No chest pain, or SHORTNESS OF BREATH  Last dose of addreall today. Smoking 1/2 pk day,since    Rare alcohol, no drugs     Mammograms, used to get them [id number] qdue to densityu in left breast and now due again,  Colonoscopy has not sone yet no fam history of colon cancer, no sx's. Diet balanced  Walks a dog, used to work out more but had back surgery 8 years ago  Does some weightlifting    Review of Systems   All other systems reviewed and are negative. Prior to Visit Medications    Medication Sig Taking? Authorizing Provider   nicotine (NICODERM CQ) 21 MG/24HR Place 1 patch onto the skin every 24 hours Yes Kim Thomas MD   buPROPion (WELLBUTRIN XL) 150 MG extended release tablet Take 1 tablet by mouth every morning Yes Kim Thomas MD   amphetamine-dextroamphetamine (ADDERALL XR) 30 MG extended release capsule Take 1 capsule by mouth every morning for 90 days. Yes Kim Thomas MD   amphetamine-dextroamphetamine (ADDERALL, 20MG,) 20 MG tablet Take 1 tablet by mouth daily for 90 days.  Yes Kim Thomas MD   varenicline (CHANTIX) 1 MG tablet Take 1 tablet by mouth 2 times daily  Patient not taking: Reported on 10/19/2021  Roxanne Camargo MD        Allergies   Allergen Reactions    Neurontin [Gabapentin]      Night sweats       Past Medical Partner Violence:     Fear of Current or Ex-Partner:     Emotionally Abused:     Physically Abused:     Sexually Abused:         Family History   Problem Relation Age of Onset    ADHD Mother        ADVANCE DIRECTIVE: N, <no information>    Vitals:    10/19/21 0931   BP: 118/84   Site: Right Upper Arm   Position: Sitting   Cuff Size: Medium Adult   Temp: 98.5 °F (36.9 °C)   TempSrc: Oral   Weight: 170 lb 3.2 oz (77.2 kg)     Estimated body mass index is 23.87 kg/m² as calculated from the following:    Height as of 6/25/21: 5' 10.8\" (1.798 m). Weight as of this encounter: 170 lb 3.2 oz (77.2 kg). Physical Exam  Vitals reviewed. Constitutional:       General: She is not in acute distress. Appearance: Normal appearance. She is not ill-appearing. HENT:      Head: Normocephalic and atraumatic. Right Ear: Tympanic membrane, ear canal and external ear normal.      Left Ear: Tympanic membrane, ear canal and external ear normal.      Nose: Nose normal. No rhinorrhea. Mouth/Throat:      Mouth: Mucous membranes are moist.      Pharynx: Oropharynx is clear. No oropharyngeal exudate. Eyes:      General: No scleral icterus. Right eye: No discharge. Left eye: No discharge. Extraocular Movements: Extraocular movements intact. Conjunctiva/sclera: Conjunctivae normal.   Cardiovascular:      Rate and Rhythm: Normal rate and regular rhythm. Pulses: Normal pulses. Heart sounds: Normal heart sounds. No murmur heard. No gallop. Pulmonary:      Effort: Pulmonary effort is normal.      Breath sounds: Normal breath sounds. No wheezing, rhonchi or rales. Abdominal:      General: Abdomen is flat. Bowel sounds are normal. There is no distension. Palpations: Abdomen is soft. There is no mass. Musculoskeletal:         General: Normal range of motion. Cervical back: Neck supple. No muscular tenderness. Lymphadenopathy:      Cervical: No cervical adenopathy. Skin:     General: Skin is warm. Capillary Refill: Capillary refill takes less than 2 seconds. Findings: No rash. Neurological:      General: No focal deficit present. Mental Status: She is alert. Cranial Nerves: No cranial nerve deficit. Psychiatric:         Mood and Affect: Mood normal.         Behavior: Behavior normal.         No flowsheet data found. No results found for: CHOL, CHOLFAST, TRIG, TRIGLYCFAST, HDL, LDLCHOLESTEROL, LDLCALC, GLUF, GLUCOSE, LABA1C    The ASCVD Risk score (Hailey Holloway, et al., 2013) failed to calculate for the following reasons:    Cannot find a previous HDL lab    Cannot find a previous total cholesterol lab    Immunization History   Administered Date(s) Administered    COVID-19, Pfizer, PF, 30mcg/0.3mL 02/19/2021, 03/11/2021    Pneumococcal Polysaccharide ([id number]) 10/19/2021    Zoster Recombinant (Shingrix) 10/19/2021       Health Maintenance   Topic Date Due    Hepatitis C screen  Never done    HIV screen  Never done    DTaP/Tdap/Td vaccine (1 - Tdap) Never done    Lipid screen  Never done    Colon cancer screen colonoscopy  Never done    Breast cancer screen  11/20/2019    Flu vaccine (1) Never done    COVID-19 Vaccine (3 - Pfizer booster) 09/11/2021    Shingles Vaccine (2 of 2) 12/14/2021    Pneumococcal 0-64 years Vaccine (2 of 2 - PPSV23) 11/20/2034    Hepatitis A vaccine  Aged Out    Hepatitis B vaccine  Aged Out    Hib vaccine  Aged Out    Meningococcal (ACWY) vaccine  Aged Out          ASSESSMENT/PLAN:  1. Encounter for annual physical exam  -     HEPATITIS C ANTIBODY; Future  -     HIV-1 AND HIV-2 ANTIBODIES; Future  -     Lipid Panel; Future  -     COMPREHENSIVE METABOLIC PANEL; Future  2. Colon cancer screening  -     Cologuard; Future  3. ADHD, predominantly inattentive type  -     amphetamine-dextroamphetamine (ADDERALL XR) 30 MG extended release capsule; Take 1 capsule by mouth every morning for 90 days. , Disp-90 capsule, R-0Normal  -     amphetamine-dextroamphetamine (ADDERALL, 20MG,) 20 MG tablet; Take 1 tablet by mouth daily for 90 days. , Disp-90 tablet, R-0Normal  -     Drug Panel-PM-HI Res-UR Interp-A  4. Need for shingles vaccine  -     Zoster recombinant [facility])  5. Need for pneumococcal vaccine  -     PNEUMOVAX 23 subcutaneous/IM (Pneumococcal polysaccharide vaccine 23-valent >= 1yo)    Counseled patient on needed vaccines she will get shingles and pneumonia shot today and will come back for tetanus and flu vaccine at later date. She will call [facility] to schedule her mammogram  She has gynecologist    No follow-ups on file. An electronic signature was used to authenticate this note.     --Ramon Pastor MD on 10/19/2021 at 10:35 AM

## 2021-10-24 LAB
6-ACETYLMORPHINE: NOT DETECTED
7-AMINOCLONAZEPAM: NOT DETECTED
ALPHA-OH-ALPRAZOLAM: NOT DETECTED
ALPHA-OH-MIDAZOLAM, URINE: NOT DETECTED
ALPRAZOLAM: NOT DETECTED
AMPHETAMINE: PRESENT
BARBITURATES: NOT DETECTED
BENZOYLECGONINE: NOT DETECTED
BUPRENORPHINE: NOT DETECTED
CARISOPRODOL: NOT DETECTED
CLONAZEPAM: NOT DETECTED
CODEINE: NOT DETECTED
CREATININE URINE: 49.4 MG/DL (ref 20–400)
DIAZEPAM: NOT DETECTED
DRUGS EXPECTED: NORMAL
EER PAIN MGT DRUG PANEL, HIGH RES/EMIT U: NORMAL
ETHYL GLUCURONIDE: NOT DETECTED
FENTANYL: NOT DETECTED
GABAPENTIN: NOT DETECTED
HYDROCODONE: NOT DETECTED
HYDROMORPHONE: NOT DETECTED
LORAZEPAM: NOT DETECTED
MARIJUANA METABOLITE: NOT DETECTED
MDA: NOT DETECTED
MDEA: NOT DETECTED
MDMA URINE: NOT DETECTED
MEPERIDINE: NOT DETECTED
METHADONE: NOT DETECTED
METHAMPHETAMINE: NOT DETECTED
METHYLPHENIDATE: NOT DETECTED
MIDAZOLAM: NOT DETECTED
MORPHINE: NOT DETECTED
NALOXONE: NOT DETECTED
NORBUPRENORPHINE, FREE: NOT DETECTED
NORDIAZEPAM: NOT DETECTED
NORFENTANYL: NOT DETECTED
NORHYDROCODONE, URINE: NOT DETECTED
NOROXYCODONE: NOT DETECTED
NOROXYMORPHONE, URINE: NOT DETECTED
OXAZEPAM: NOT DETECTED
OXYCODONE: NOT DETECTED
OXYMORPHONE: NOT DETECTED
PAIN MANAGEMENT DRUG PANEL: NORMAL
PAIN MANAGEMENT DRUG PANEL: NORMAL
PCP: NOT DETECTED
PHENTERMINE: NOT DETECTED
PREGABALIN: NOT DETECTED
TAPENTADOL, URINE: NOT DETECTED
TAPENTADOL-O-SULFATE, URINE: NOT DETECTED
TEMAZEPAM: NOT DETECTED
TRAMADOL: NOT DETECTED
ZOLPIDEM: NOT DETECTED

## 2021-11-15 NOTE — TELEPHONE ENCOUNTER
Medication:   Requested Prescriptions     Pending Prescriptions Disp Refills    buPROPion (WELLBUTRIN XL) 150 MG extended release tablet [Pharmacy Med Name: BUPROPION HCL  MG TABLET] 30 tablet 0     Sig: TAKE 1 TABLET BY MOUTH EVERY DAY IN THE MORNING     Last Filled:  10/19/21    Last appt: 10/19/2021   Next appt: Visit date not found    Last OARRS:   RX Monitoring 3/11/2019   Attestation The Prescription Monitoring Report for this patient was reviewed today.    Periodic Controlled Substance Monitoring No signs of potential drug abuse or diversion identified: otherwise, see note documentation

## 2021-11-17 ENCOUNTER — TELEPHONE (OUTPATIENT)
Dept: PRIMARY CARE CLINIC | Age: 51
End: 2021-11-17

## 2021-11-22 RX ORDER — BUPROPION HYDROCHLORIDE 150 MG/1
TABLET ORAL
Qty: 30 TABLET | Refills: 0 | Status: SHIPPED | OUTPATIENT
Start: 2021-11-22 | End: 2021-12-17

## 2021-12-16 NOTE — TELEPHONE ENCOUNTER
Medication:   Requested Prescriptions     Pending Prescriptions Disp Refills    buPROPion (WELLBUTRIN XL) 150 MG extended release tablet [Pharmacy Med Name: BUPROPION HCL  MG TABLET] 30 tablet 0     Sig: TAKE 1 TABLET BY MOUTH EVERY DAY IN THE MORNING     Last Filled: 11.22.21  Last appt: 10/19/2021   Next appt: Visit date not found    Last OARRS:   RX Monitoring 3/11/2019   Attestation The Prescription Monitoring Report for this patient was reviewed today.    Periodic Controlled Substance Monitoring No signs of potential drug abuse or diversion identified: otherwise, see note documentation

## 2021-12-17 RX ORDER — BUPROPION HYDROCHLORIDE 150 MG/1
TABLET ORAL
Qty: 30 TABLET | Refills: 0 | Status: SHIPPED | OUTPATIENT
Start: 2021-12-17 | End: 2022-01-24 | Stop reason: SDUPTHER

## 2022-01-24 RX ORDER — BUPROPION HYDROCHLORIDE 150 MG/1
TABLET ORAL
Qty: 90 TABLET | Refills: 1 | Status: SHIPPED | OUTPATIENT
Start: 2022-01-24 | End: 2022-08-15

## 2022-01-24 NOTE — TELEPHONE ENCOUNTER
Medication:   Requested Prescriptions     Pending Prescriptions Disp Refills    buPROPion (WELLBUTRIN XL) 150 MG extended release tablet 30 tablet 0     Last Filled:  12.17.21    Last appt: 10/19/2021   Next appt: Visit date not found    Last OARRS:   RX Monitoring 3/11/2019   Attestation The Prescription Monitoring Report for this patient was reviewed today.    Periodic Controlled Substance Monitoring No signs of potential drug abuse or diversion identified: otherwise, see note documentation

## 2022-02-05 DIAGNOSIS — F90.0 ADHD, PREDOMINANTLY INATTENTIVE TYPE: ICD-10-CM

## 2022-02-05 NOTE — TELEPHONE ENCOUNTER
Medication:   Requested Prescriptions     Pending Prescriptions Disp Refills    amphetamine-dextroamphetamine (ADDERALL XR) 30 MG extended release capsule 90 capsule 0     Sig: Take 1 capsule by mouth every morning for 90 days.  amphetamine-dextroamphetamine (ADDERALL, 20MG,) 20 MG tablet 90 tablet 0     Sig: Take 1 tablet by mouth daily for 90 days. Last Filled: 10.19.21 10.19.21    Last appt: 10/19/2021   Next appt: 2/8/2022    Last OARRS:   RX Monitoring 3/11/2019   Attestation The Prescription Monitoring Report for this patient was reviewed today.    Periodic Controlled Substance Monitoring No signs of potential drug abuse or diversion identified: otherwise, see note documentation

## 2022-02-05 NOTE — TELEPHONE ENCOUNTER
----- Message from [email or web address] sent at 2/4/2022 12:04 PM EST -----  Subject: Refill Request    QUESTIONS  Name of Medication? amphetamine-dextroamphetamine (ADDERALL, 20MG,) 20 MG   tablet  Patient-reported dosage and instructions? 1 tablet daily  How many days do you have left? 0  Preferred Pharmacy? [id number]  Pharmacy phone number (if available)? [phone]  ---------------------------------------------------------------------------  --------------,  Name of Medication? amphetamine-dextroamphetamine (ADDERALL XR) 30 MG   extended release capsule  Patient-reported dosage and instructions? 1 tablet daily  How many days do you have left? 0  Preferred Pharmacy? [id number]  Pharmacy phone number (if available)? [phone]  Additional Information for Provider? Patient scheduled a visit for   Tuesday, she would like to know if a temporary script can be called in to   get her through to her appointment. Please call and advise.   ---------------------------------------------------------------------------  --------------  CALL BACK INFO  What is the best way for the office to contact you? OK to leave message on   voicemail  Preferred Call Back Phone Number?  [phone]

## 2022-02-07 RX ORDER — DEXTROAMPHETAMINE SACCHARATE, AMPHETAMINE ASPARTATE MONOHYDRATE, DEXTROAMPHETAMINE SULFATE AND AMPHETAMINE SULFATE 7.5; 7.5; 7.5; 7.5 MG/1; MG/1; MG/1; MG/1
30 CAPSULE, EXTENDED RELEASE ORAL EVERY MORNING
Qty: 90 CAPSULE | Refills: 0 | Status: SHIPPED | OUTPATIENT
Start: 2022-02-07 | End: 2022-05-19 | Stop reason: SDUPTHER

## 2022-02-07 RX ORDER — DEXTROAMPHETAMINE SACCHARATE, AMPHETAMINE ASPARTATE, DEXTROAMPHETAMINE SULFATE AND AMPHETAMINE SULFATE 5; 5; 5; 5 MG/1; MG/1; MG/1; MG/1
20 TABLET ORAL DAILY
Qty: 90 TABLET | Refills: 0 | Status: SHIPPED | OUTPATIENT
Start: 2022-02-07 | End: 2022-05-19 | Stop reason: SDUPTHER

## 2022-02-08 ENCOUNTER — VIRTUAL VISIT (OUTPATIENT)
Dept: PRIMARY CARE CLINIC | Age: 52
End: 2022-02-08
Payer: COMMERCIAL

## 2022-02-08 DIAGNOSIS — F90.0 ADHD, PREDOMINANTLY INATTENTIVE TYPE: Primary | ICD-10-CM

## 2022-02-08 DIAGNOSIS — Z72.0 TOBACCO USE: ICD-10-CM

## 2022-02-08 PROCEDURE — 99214 OFFICE O/P EST MOD 30 MIN: CPT | Performed by: STUDENT IN AN ORGANIZED HEALTH CARE EDUCATION/TRAINING PROGRAM

## 2022-02-08 NOTE — PROGRESS NOTES
Carlos Aragon (:  1970) is a Established patient, here for evaluation of the following:    Assessment & Plan   Below is the assessment and plan developed based on review of pertinent history, physical exam, labs, studies, and medications. 1. ADHD, predominantly inattentive type  Assessment & Plan:   Chronic stable  Already sent in medication couple days ago  Follow-up in 3 months  2. Tobacco use  Assessment & Plan:   Chronic well-controlled  Has improved on cigarette smoking but still not at goal which would be complete cessation  Continue with NicoDerm patches as well as Wellbutrin    No follow-ups on file. Subjective   HPI     Adderall stable no side effects  Has not done Cologuard yet but she plans on  Has been doing patches and Wellbutrin and has cut down on her cigarettes  Was unable to get blood work because the lab turned her away because she was not fasting. Review of Systems   All other systems reviewed and are negative. Objective   Patient-Reported Vitals  Patient-Reported Weight: 165lb  Patient-Reported Height: 5'10\"       Physical Exam  Vitals reviewed. Constitutional:       General: She is not in acute distress. Appearance: Normal appearance. She is not ill-appearing. HENT:      Head: Normocephalic and atraumatic. Right Ear: External ear normal.      Left Ear: External ear normal.   Eyes:      General: No scleral icterus. Right eye: No discharge. Left eye: No discharge. Extraocular Movements: Extraocular movements intact. Conjunctiva/sclera: Conjunctivae normal.   Pulmonary:      Effort: Pulmonary effort is normal. No respiratory distress. Musculoskeletal:      Cervical back: Neck supple. Skin:     Coloration: Skin is not jaundiced. Findings: No lesion or rash. Neurological:      Mental Status: She is alert. Cranial Nerves: No cranial nerve deficit.       Coordination: Coordination normal.   Psychiatric:         Mood and Affect: Mood normal.                  Daily Juan, was evaluated through a synchronous (real-time) audio-video encounter. The patient (or guardian if applicable) is aware that this is a billable service, which includes applicable co-pays. This Virtual Visit was conducted with patient's (and/or legal guardian's) consent. The visit was conducted pursuant to the emergency declaration under the [address] [place] [address] and the [employer] General Act. Patient identification was verified, and a caregiver was present when appropriate. The patient was located at home in a state where the provider was licensed to provide care.        --Ethan Vaughan MD

## 2022-02-08 NOTE — ASSESSMENT & PLAN NOTE
Chronic well-controlled  Has improved on cigarette smoking but still not at goal which would be complete cessation  Continue with NicoDerm patches as well as Wellbutrin

## 2022-02-08 NOTE — PROGRESS NOTES
Within this Telehealth Consent, the terms you and yours refer to the person using the Telehealth Service (Service), or in the case of a use of the Service by or on behalf of a minor, you and yours refer to and include (i) the parent or legal guardian who provides consent to the use of the Service by such minor or uses the Service on behalf of such minor, and (ii) the minor for whom consent is being provided or on whose behalf the Service is being utilized. When using Service, you will be consulting with your health care providers via the use of Telehealth.   Telehealth involves the delivery of healthcare services using electronic communications, information technology or other means between a healthcare provider and a patient who are not in the same physical location. Telehealth may be used for diagnosis, treatment, follow-up and/or patient education, and may include, but is not limited to, one or more of the following:    Electronic transmission of medical records, photo images, personal health information or other data between a patient and a healthcare provider    Interactions between a patient and healthcare provider via audio, video and/or data communications    Use of output data from medical devices, sound and video files    Anticipated Benefits   The use of Telehealth by your Provider(s) through the Service may have the following possible benefits:    Making it easier and more efficient for you to access medical care and treatment for the conditions treated by such Provider(s) utilizing the Service    Allowing you to obtain medical care and treatment by Provider(s) at times that are convenient for you    Enabling you to interact with Provider(s) without the necessity of an in-office appointment     Possible Risks   While the use of Telehealth can provide potential benefits for you, there are also potential risks associated with the use of Telehealth.  These risks include, but may not be limited to the following:    Your Provider(s) may not able to provide medical treatment for your particular condition and you may be required to seek alternative healthcare or emergency care services.  The electronic systems or other security protocols or safeguards used in the Service could fail, causing a breach of privacy of your medical or other information.  Given regulatory requirements in certain jurisdictions, your Provider(s) diagnosis and/or treatment options, especially pertaining to certain prescriptions, may be limited. Acceptance   1. You understand that Services will be provided via Telehealth. This process involves the use of HIPAA compliant and secure, real-time audio-visual interfacing with a qualified and appropriately trained provider located at [facility]. 2. You understand that, under no circumstances, will this session be recorded. 3. You understand that the Provider(s) at [facility] and other clinical participants will be party to the information obtained during the Telehealth session in accordance with best medical practices. 4. You understand that the information obtained during the Telehealth session will be used to help determine the most appropriate treatment options. 5. You understand that You have the right to revoke this consent at any point in time. 6. You understand that Telehealth is voluntary, and that continued treatment is not dependent upon consent. 7. You understand that, in the event of non-consent to Telehealth services and/or technical difficulties, you will obtain services as typically provided in the absence of Telehealth technology. 8. You understand that this consent will be kept in Your medical record. 9. No potential benefits from the use of Telehealth or specific results can be guaranteed. Your condition may not be cured or improved and, in some cases, may get worse.    10. There are limitations in the provision of medical care and treatment via Telehealth and the Service and you may not be able to receive diagnosis and/or treatment through the Service for every condition for which you seek diagnosis and/or treatment. 11. There are potential risks to the use of Telehealth, including but not limited to the risks described in this Telehealth Consent. 12. Your Provider(s) have discussed the use of Telehealth and the Service with you, including the benefits and risks of such and you have provided oral consent to your Provider(s) for the use of Telehealth and the Service. 15. You understand that it is your duty to provide your Provider(s) truthful, accurate and complete information, including all relevant information regarding care that you may have received or may be receiving from other healthcare providers outside of the Service. 14. You understand that each of your Provider(s) may determine in his or sole discretion that your condition is not suitable for diagnosis and/or treatment using the Service, and that you may need to seek medical care and treatment a specialist or other healthcare provider, outside of the Service. 15. You understand that you are fully responsible for payment for all services provided by Provider(s) or through use of the Service and that you may not be able to use third-party insurance. 16. You represent that (a) you have read this Telehealth Consent carefully, (b) you understand the risks and benefits of the Service and the use of Telehealth in the medical care and treatment provided to you by Provider(s) using the Service, and (c) you have the legal capacity and authority to provide this consent for yourself and/or the minor for which you are consenting under applicable federal and state laws, including laws relating to the age of [de-identified] and/or parental/guardian consent.    17. You give your informed consent to the use of Telehealth by Provider(s) using the Service under the terms described in the Terms of Service and this Telehealth Consent. The patient was read the following statement and has consented to the visit as of 2/8/22.      The patient has been scheduled for their first telehealth visit on 2/8/2022 with Dene Schaumann, MD.

## 2022-05-19 ENCOUNTER — TELEMEDICINE (OUTPATIENT)
Dept: PRIMARY CARE CLINIC | Age: 52
End: 2022-05-19
Payer: COMMERCIAL

## 2022-05-19 DIAGNOSIS — F90.0 ADHD, PREDOMINANTLY INATTENTIVE TYPE: ICD-10-CM

## 2022-05-19 PROCEDURE — 99213 OFFICE O/P EST LOW 20 MIN: CPT | Performed by: STUDENT IN AN ORGANIZED HEALTH CARE EDUCATION/TRAINING PROGRAM

## 2022-05-19 RX ORDER — DEXTROAMPHETAMINE SACCHARATE, AMPHETAMINE ASPARTATE, DEXTROAMPHETAMINE SULFATE AND AMPHETAMINE SULFATE 5; 5; 5; 5 MG/1; MG/1; MG/1; MG/1
20 TABLET ORAL DAILY
Qty: 90 TABLET | Refills: 0 | Status: SHIPPED | OUTPATIENT
Start: 2022-05-19 | End: 2022-08-27 | Stop reason: SDUPTHER

## 2022-05-19 RX ORDER — DEXTROAMPHETAMINE SACCHARATE, AMPHETAMINE ASPARTATE MONOHYDRATE, DEXTROAMPHETAMINE SULFATE AND AMPHETAMINE SULFATE 7.5; 7.5; 7.5; 7.5 MG/1; MG/1; MG/1; MG/1
30 CAPSULE, EXTENDED RELEASE ORAL EVERY MORNING
Qty: 90 CAPSULE | Refills: 0 | Status: SHIPPED | OUTPATIENT
Start: 2022-05-19 | End: 2022-10-14 | Stop reason: SDUPTHER

## 2022-05-19 NOTE — PROGRESS NOTES
Sacha Rodrigez (:  1970) is a Established patient, here for evaluation of the following:    Assessment & Plan   Below is the assessment and plan developed based on review of pertinent history, physical exam, labs, studies, and medications. 1. ADHD, predominantly inattentive type  -     amphetamine-dextroamphetamine (ADDERALL XR) 30 MG extended release capsule; Take 1 capsule by mouth every morning for 90 days. , Disp-90 capsule, R-0Normal  -     amphetamine-dextroamphetamine (ADDERALL, 20MG,) 20 MG tablet; Take 1 tablet by mouth daily for 90 days. , Disp-90 tablet, R-0Normal    Return in about 3 months (around 2022). Subjective   HPI       ADHD  No chest pain, shortness of breath, palpitations, weightloss, irritability, insomnia, headaches or lightheadedness. Review of Systems   All other systems reviewed and are negative. Objective   Patient-Reported Vitals  Patient-Reported Weight: 160lb  Patient-Reported Height: 5'10\"       Physical Exam  Vitals reviewed. Constitutional:       General: She is not in acute distress. Appearance: Normal appearance. She is not ill-appearing. HENT:      Head: Normocephalic and atraumatic. Right Ear: External ear normal.      Left Ear: External ear normal.   Eyes:      General: No scleral icterus. Right eye: No discharge. Left eye: No discharge. Extraocular Movements: Extraocular movements intact. Conjunctiva/sclera: Conjunctivae normal.   Pulmonary:      Effort: Pulmonary effort is normal. No respiratory distress. Musculoskeletal:      Cervical back: Neck supple. Skin:     Coloration: Skin is not jaundiced. Findings: No lesion or rash. Neurological:      Mental Status: She is alert. Cranial Nerves: No cranial nerve deficit.       Coordination: Coordination normal.   Psychiatric:         Mood and Affect: Mood normal.                  Sadie Rodrigues, was evaluated through a synchronous (real-time) audio-video encounter. The patient (or guardian if applicable) is aware that this is a billable service, which includes applicable co-pays. This Virtual Visit was conducted with patient's (and/or legal guardian's) consent. The visit was conducted pursuant to the emergency declaration under the [address] [place] [address] authority and the [employer] General Act. Patient identification was verified, and a caregiver was present when appropriate. The patient was located at home in a state where the provider was licensed to provide care.        --Kirti Antony MD

## 2022-08-15 RX ORDER — BUPROPION HYDROCHLORIDE 150 MG/1
TABLET ORAL
Qty: 90 TABLET | Refills: 1 | Status: SHIPPED | OUTPATIENT
Start: 2022-08-15 | End: 2022-10-18 | Stop reason: SDUPTHER

## 2022-08-15 NOTE — TELEPHONE ENCOUNTER
Medication:   Requested Prescriptions     Pending Prescriptions Disp Refills    buPROPion (WELLBUTRIN XL) 150 MG extended release tablet [Pharmacy Med Name: BUPROPION HCL  MG TABLET] 90 tablet 1     Sig: TAKE 1 TABLET BY MOUTH EVERY DAY IN THE MORNING     Last Filled:  1.24.22    Last appt: 5/19/2022   Next appt: Visit date not found    Last OARRS:   RX Monitoring 3/11/2019   Attestation The Prescription Monitoring Report for this patient was reviewed today.    Periodic Controlled Substance Monitoring No signs of potential drug abuse or diversion identified: otherwise, see note documentation

## 2022-08-17 NOTE — TELEPHONE ENCOUNTER
LOV 6.4.20
Will refill but due for a visit  The Adderall XR formulation is not due until September 18.   I asked the pharmacist to withhold that prescription until that date
amphetamine-dextroamphetamine (ADDERALL XR) 30 MG extended release capsule [[id number]]     Order Details   Dose: 30 mg  Route: Oral  Frequency: EVERY MORNING    Dispense Quantity: 30 capsule  Refills: 0           amphetamine-dextroamphetamine (ADDERALL, 20MG,) 20 MG tablet [[id number]]     Order Details   Dose: 20 mg  Route: Oral  Frequency: DAILY    Dispense Quantity: 30 tablet  Refills: 0  Fills remaining: --             Sig: Take 1 tablet by mouth daily for 30 days.          [facility]/PHARMACY #[id number] [address] [phone]
,

## 2022-08-27 DIAGNOSIS — F90.0 ADHD, PREDOMINANTLY INATTENTIVE TYPE: ICD-10-CM

## 2022-08-29 RX ORDER — DEXTROAMPHETAMINE SACCHARATE, AMPHETAMINE ASPARTATE, DEXTROAMPHETAMINE SULFATE AND AMPHETAMINE SULFATE 5; 5; 5; 5 MG/1; MG/1; MG/1; MG/1
20 TABLET ORAL DAILY
Qty: 90 TABLET | Refills: 0 | Status: SHIPPED | OUTPATIENT
Start: 2022-08-29 | End: 2022-10-18

## 2022-08-29 RX ORDER — DEXTROAMPHETAMINE SACCHARATE, AMPHETAMINE ASPARTATE, DEXTROAMPHETAMINE SULFATE AND AMPHETAMINE SULFATE 5; 5; 5; 5 MG/1; MG/1; MG/1; MG/1
20 TABLET ORAL DAILY
Qty: 90 TABLET | Refills: 0 | Status: SHIPPED | OUTPATIENT
Start: 2022-08-29 | End: 2022-10-18 | Stop reason: SDUPTHER

## 2022-08-29 NOTE — TELEPHONE ENCOUNTER
Medication:   Requested Prescriptions     Pending Prescriptions Disp Refills    amphetamine-dextroamphetamine (ADDERALL, 20MG,) 20 MG tablet 90 tablet 0     Sig: Take 1 tablet by mouth daily for 90 days. Last Filled:  8.29.22    Last appt: 5/19/2022   Next appt: 9/2/2022    Last OARRS:   RX Monitoring 3/11/2019   Attestation The Prescription Monitoring Report for this patient was reviewed today.    Periodic Controlled Substance Monitoring No signs of potential drug abuse or diversion identified: otherwise, see note documentation

## 2022-10-13 DIAGNOSIS — Z12.11 COLON CANCER SCREENING: ICD-10-CM

## 2022-10-13 DIAGNOSIS — Z00.00 ENCOUNTER FOR ANNUAL PHYSICAL EXAM: ICD-10-CM

## 2022-10-13 LAB
A/G RATIO: 2.2 (ref 1.1–2.2)
ALBUMIN SERPL-MCNC: 4.3 G/DL (ref 3.4–5)
ALP BLD-CCNC: 88 U/L (ref 40–129)
ALT SERPL-CCNC: 15 U/L (ref 10–40)
ANION GAP SERPL CALCULATED.3IONS-SCNC: 12 MMOL/L (ref 3–16)
AST SERPL-CCNC: 20 U/L (ref 15–37)
BILIRUB SERPL-MCNC: <0.2 MG/DL (ref 0–1)
BUN BLDV-MCNC: 13 MG/DL (ref 7–20)
CALCIUM SERPL-MCNC: 9 MG/DL (ref 8.3–10.6)
CHLORIDE BLD-SCNC: 106 MMOL/L (ref 99–110)
CHOLESTEROL, TOTAL: 222 MG/DL (ref 0–199)
CO2: 24 MMOL/L (ref 21–32)
CREAT SERPL-MCNC: 0.7 MG/DL (ref 0.6–1.1)
GFR AFRICAN AMERICAN: >60
GFR NON-AFRICAN AMERICAN: >60
GLUCOSE BLD-MCNC: 83 MG/DL (ref 70–99)
HDLC SERPL-MCNC: 48 MG/DL (ref 40–60)
HEPATITIS C ANTIBODY INTERPRETATION: NORMAL
LDL CHOLESTEROL CALCULATED: 153 MG/DL
POTASSIUM SERPL-SCNC: 4.4 MMOL/L (ref 3.5–5.1)
SODIUM BLD-SCNC: 142 MMOL/L (ref 136–145)
TOTAL PROTEIN: 6.3 G/DL (ref 6.4–8.2)
TRIGL SERPL-MCNC: 104 MG/DL (ref 0–150)
VLDLC SERPL CALC-MCNC: 21 MG/DL

## 2022-10-14 DIAGNOSIS — F90.0 ADHD, PREDOMINANTLY INATTENTIVE TYPE: ICD-10-CM

## 2022-10-14 LAB
HIV AG/AB: NORMAL
HIV ANTIGEN: NORMAL
HIV-1 ANTIBODY: NORMAL
HIV-2 AB: NORMAL

## 2022-10-14 RX ORDER — DEXTROAMPHETAMINE SACCHARATE, AMPHETAMINE ASPARTATE MONOHYDRATE, DEXTROAMPHETAMINE SULFATE AND AMPHETAMINE SULFATE 7.5; 7.5; 7.5; 7.5 MG/1; MG/1; MG/1; MG/1
30 CAPSULE, EXTENDED RELEASE ORAL EVERY MORNING
Qty: 90 CAPSULE | Refills: 0 | Status: SHIPPED | OUTPATIENT
Start: 2022-10-14 | End: 2022-10-18 | Stop reason: SDUPTHER

## 2022-10-14 NOTE — TELEPHONE ENCOUNTER
Medication:   Requested Prescriptions     Pending Prescriptions Disp Refills    amphetamine-dextroamphetamine (ADDERALL XR) 30 MG extended release capsule 90 capsule 0     Sig: Take 1 capsule by mouth every morning for 90 days.      Last Filled:  8/29/22    Last appt: 5/19/2022   Next appt: 10/18/2022

## 2022-10-18 ENCOUNTER — TELEMEDICINE (OUTPATIENT)
Dept: PRIMARY CARE CLINIC | Age: 52
End: 2022-10-18
Payer: COMMERCIAL

## 2022-10-18 ENCOUNTER — TELEPHONE (OUTPATIENT)
Dept: PRIMARY CARE CLINIC | Age: 52
End: 2022-10-18

## 2022-10-18 DIAGNOSIS — F90.0 ADHD, PREDOMINANTLY INATTENTIVE TYPE: Primary | ICD-10-CM

## 2022-10-18 DIAGNOSIS — Z72.0 TOBACCO USE: ICD-10-CM

## 2022-10-18 DIAGNOSIS — Z12.11 SCREEN FOR COLON CANCER: ICD-10-CM

## 2022-10-18 PROCEDURE — 99214 OFFICE O/P EST MOD 30 MIN: CPT | Performed by: STUDENT IN AN ORGANIZED HEALTH CARE EDUCATION/TRAINING PROGRAM

## 2022-10-18 RX ORDER — BUPROPION HYDROCHLORIDE 150 MG/1
150 TABLET ORAL EVERY MORNING
Qty: 90 TABLET | Refills: 0 | Status: SHIPPED | OUTPATIENT
Start: 2022-10-18

## 2022-10-18 RX ORDER — DEXTROAMPHETAMINE SACCHARATE, AMPHETAMINE ASPARTATE, DEXTROAMPHETAMINE SULFATE AND AMPHETAMINE SULFATE 5; 5; 5; 5 MG/1; MG/1; MG/1; MG/1
20 TABLET ORAL DAILY
Qty: 90 TABLET | Refills: 0 | Status: SHIPPED | OUTPATIENT
Start: 2022-10-18 | End: 2023-01-16

## 2022-10-18 RX ORDER — NICOTINE 21 MG/24HR
1 PATCH, TRANSDERMAL 24 HOURS TRANSDERMAL EVERY 24 HOURS
Qty: 30 PATCH | Refills: 1 | Status: SHIPPED | OUTPATIENT
Start: 2022-10-18 | End: 2023-10-18

## 2022-10-18 RX ORDER — DEXTROAMPHETAMINE SACCHARATE, AMPHETAMINE ASPARTATE MONOHYDRATE, DEXTROAMPHETAMINE SULFATE AND AMPHETAMINE SULFATE 7.5; 7.5; 7.5; 7.5 MG/1; MG/1; MG/1; MG/1
30 CAPSULE, EXTENDED RELEASE ORAL EVERY MORNING
Qty: 90 CAPSULE | Refills: 0 | Status: SHIPPED | OUTPATIENT
Start: 2022-10-18 | End: 2023-01-16

## 2022-10-18 SDOH — ECONOMIC STABILITY: FOOD INSECURITY: WITHIN THE PAST 12 MONTHS, THE FOOD YOU BOUGHT JUST DIDN'T LAST AND YOU DIDN'T HAVE MONEY TO GET MORE.: NEVER TRUE

## 2022-10-18 SDOH — ECONOMIC STABILITY: FOOD INSECURITY: WITHIN THE PAST 12 MONTHS, YOU WORRIED THAT YOUR FOOD WOULD RUN OUT BEFORE YOU GOT MONEY TO BUY MORE.: NEVER TRUE

## 2022-10-18 ASSESSMENT — PATIENT HEALTH QUESTIONNAIRE - PHQ9
SUM OF ALL RESPONSES TO PHQ QUESTIONS 1-9: 0
1. LITTLE INTEREST OR PLEASURE IN DOING THINGS: 0
SUM OF ALL RESPONSES TO PHQ9 QUESTIONS 1 & 2: 0
SUM OF ALL RESPONSES TO PHQ QUESTIONS 1-9: 0
SUM OF ALL RESPONSES TO PHQ QUESTIONS 1-9: 0
2. FEELING DOWN, DEPRESSED OR HOPELESS: 0
SUM OF ALL RESPONSES TO PHQ QUESTIONS 1-9: 0

## 2022-10-18 ASSESSMENT — SOCIAL DETERMINANTS OF HEALTH (SDOH): HOW HARD IS IT FOR YOU TO PAY FOR THE VERY BASICS LIKE FOOD, HOUSING, MEDICAL CARE, AND HEATING?: NOT HARD AT ALL

## 2022-10-18 NOTE — PROGRESS NOTES
Syl Vidal (:  1970) is a Established patient, here for evaluation of the following:    Assessment & Plan   Below is the assessment and plan developed based on review of pertinent history, physical exam, labs, studies, and medications. 1. ADHD, predominantly inattentive type  Chronic well controlled  -     amphetamine-dextroamphetamine (ADDERALL XR) 30 MG extended release capsule; Take 1 capsule by mouth every morning for 90 days. , Disp-90 capsule, R-0Normal  -     amphetamine-dextroamphetamine (ADDERALL, 20MG,) 20 MG tablet; Take 1 tablet by mouth daily for 90 days. , Disp-90 tablet, R-0Normal  2. Tobacco use  Chronic well controlled  - will start weaning down on the patch   -     buPROPion (WELLBUTRIN XL) 150 MG extended release tablet; Take 1 tablet by mouth every morning, Disp-90 tablet, R-0Normal  -     nicotine (NICODERM CQ) 14 MG/24HR; Place 1 patch onto the skin every 24 hours, Disp-30 patch, R-1Normal  3. Screen for colon cancer  -     Cologuard (Fecal DNA Colorectal Cancer Screening)    Return in about 3 months (around 2023) for Annual Physical.       Subjective   HPI    ADHD stable, compliant with meds no side effects    Tobacco free now but she is still on 21 mg patch and wellbutrin    Cologuard box , needs new one    Review of Systems   All other systems reviewed and are negative. Objective   Patient-Reported Vitals  No data recorded     Physical Exam  Vitals reviewed. Constitutional:       General: She is not in acute distress. Appearance: Normal appearance. She is not ill-appearing. HENT:      Head: Normocephalic and atraumatic. Right Ear: External ear normal.      Left Ear: External ear normal.   Eyes:      General: No scleral icterus. Right eye: No discharge. Left eye: No discharge. Extraocular Movements: Extraocular movements intact.       Conjunctiva/sclera: Conjunctivae normal.   Pulmonary:      Effort: Pulmonary effort is normal. No respiratory distress. Musculoskeletal:      Cervical back: Neck supple. Skin:     Coloration: Skin is not jaundiced. Findings: No lesion or rash. Neurological:      Mental Status: She is alert. Cranial Nerves: No cranial nerve deficit. Coordination: Coordination normal.   Psychiatric:         Mood and Affect: Mood normal.                Sadie Del Toro, was evaluated through a synchronous (real-time) audio-video encounter. The patient (or guardian if applicable) is aware that this is a billable service, which includes applicable co-pays. This Virtual Visit was conducted with patient's (and/or legal guardian's) consent. The visit was conducted pursuant to the emergency declaration under the [facility], [facility] authority and the [email or web address] and [employer] General Act. Patient identification was verified, and a caregiver was present when appropriate. The patient was located at Home: Emmafavian Valerio 1998 [address] [place] Provider was located at [facility] (Appt Dept): [address]  [facility].         --Nhugn Longoria MD

## 2022-10-18 NOTE — TELEPHONE ENCOUNTER
the following: Your Provider(s) may not able to provide medical treatment for your particular condition and you may be required to seek alternative healthcare or emergency care services. The electronic systems or other security protocols or safeguards used in the Service could fail, causing a breach of privacy of your medical or other information. Given regulatory requirements in certain jurisdictions, your Provider(s) diagnosis and/or treatment options, especially pertaining to certain prescriptions, may be limited. Acceptance   You understand that Services will be provided via Telehealth. This process involves the use of HIPAA compliant and secure, real-time audio-visual interfacing with a qualified and appropriately trained provider located at [facility]. You understand that, under no circumstances, will this session be recorded. You understand that the Provider(s) at [facility] and other clinical participants will be party to the information obtained during the Telehealth session in accordance with best medical practices. You understand that the information obtained during the Telehealth session will be used to help determine the most appropriate treatment options. You understand that You have the right to revoke this consent at any point in time. You understand that Telehealth is voluntary, and that continued treatment is not dependent upon consent. You understand that, in the event of non-consent to Telehealth services and/or technical difficulties, you will obtain services as typically provided in the absence of Telehealth technology. You understand that this consent will be kept in Your medical record. No potential benefits from the use of Telehealth or specific results can be guaranteed. Your condition may not be cured or improved and, in some cases, may get worse.    There are limitations in the provision of medical care and treatment via Telehealth and the Service and you may not be able to receive diagnosis and/or treatment through the Service for every condition for which you seek diagnosis and/or treatment. There are potential risks to the use of Telehealth, including but not limited to the risks described in this Telehealth Consent. Your Provider(s) have discussed the use of Telehealth and the Service with you, including the benefits and risks of such and you have provided oral consent to your Provider(s) for the use of Telehealth and the Service. You understand that it is your duty to provide your Provider(s) truthful, accurate and complete information, including all relevant information regarding care that you may have received or may be receiving from other healthcare providers outside of the Service. You understand that each of your Provider(s) may determine in his or sole discretion that your condition is not suitable for diagnosis and/or treatment using the Service, and that you may need to seek medical care and treatment a specialist or other healthcare provider, outside of the Service. You understand that you are fully responsible for payment for all services provided by Provider(s) or through use of the Service and that you may not be able to use third-party insurance. You represent that (a) you have read this Telehealth Consent carefully, (b) you understand the risks and benefits of the Service and the use of Telehealth in the medical care and treatment provided to you by Provider(s) using the Service, and (c) you have the legal capacity and authority to provide this consent for yourself and/or the minor for which you are consenting under applicable federal and state laws, including laws relating to the age of [de-identified] and/or parental/guardian consent. You give your informed consent to the use of Telehealth by Provider(s) using the Service under the terms described in the Terms of Service and this Telehealth Consent. The patient was read the following statement and has consented to the visit as of 10/18/22. The patient has been scheduled for their first telehealth visit on 10/18/2022 with Dr Shazia Hayes.

## 2023-01-24 DIAGNOSIS — F90.0 ADHD, PREDOMINANTLY INATTENTIVE TYPE: ICD-10-CM

## 2023-01-24 RX ORDER — DEXTROAMPHETAMINE SACCHARATE, AMPHETAMINE ASPARTATE MONOHYDRATE, DEXTROAMPHETAMINE SULFATE AND AMPHETAMINE SULFATE 7.5; 7.5; 7.5; 7.5 MG/1; MG/1; MG/1; MG/1
30 CAPSULE, EXTENDED RELEASE ORAL EVERY MORNING
Qty: 90 CAPSULE | Refills: 0 | Status: SHIPPED | OUTPATIENT
Start: 2023-01-24 | End: 2023-04-24

## 2023-01-24 NOTE — TELEPHONE ENCOUNTER
Medication:   Requested Prescriptions     Pending Prescriptions Disp Refills    amphetamine-dextroamphetamine (ADDERALL XR) 30 MG extended release capsule 90 capsule 0     Sig: Take 1 capsule by mouth every morning for 90 days. Last Filled:      Patient Phone Number: [phone] (home) [phone] (work)    Last appt: 10/18/2022   Next appt: Visit date not found    Last OARRS:   RX Monitoring 3/11/2019   Attestation The Prescription Monitoring Report for this patient was reviewed today. Periodic Controlled Substance Monitoring No signs of potential drug abuse or diversion identified: otherwise, see note documentation       Preferred Pharmacy:   [address] [place] [address] [phone]  [facility] [address]  Phone: [phone] Fax: [phone]    April Jasmine [address] [place] [address] [phone] - f [phone]  [address] [place] [id number]  Phone: [phone] Fax: [phone]    [facility]/pharmacy #[id number]- [address] [phone] - f [phone]  [address] [place] [address]  Phone: [phone] Fax: [phone]    Meghna Torres [id number] [address] [phone] [facility] [phone]  [address] [place] [address]  Phone: [phone] Fax: [phone]  Medication:   Requested Prescriptions     Pending Prescriptions Disp Refills    amphetamine-dextroamphetamine (ADDERALL XR) 30 MG extended release capsule 90 capsule 0     Sig: Take 1 capsule by mouth every morning for 90 days. Last Filled:      Patient Phone Number: [phone] (home) [phone] (work)    Last appt: 10/18/2022   Next appt: Visit date not found    Last OARRS:   RX Monitoring 3/11/2019   Attestation The Prescription Monitoring Report for this patient was reviewed today.    Periodic Controlled Substance Monitoring No signs of potential drug abuse or diversion identified: otherwise, see note documentation       Preferred Pharmacy:   [address] [place] [address] [phone]  Emma  [address]  Phone: [phone] Fax: [phone]    [place] [address] [address] [place] [address] [phone] - f [phone]  [address] [place] [phone]  Phone: [phone] Fax: [phone]    [facility]/pharmacy #[id number]- [address], [address] [facility] [phone] - f [phone]  [address]   Mikki Benavides [address]  Phone: [phone] Fax: [phone]    [facility] [id number] [place], [place] - [address] [place] [address] [address] [place] [address] -  [phone] Cecilia Talaveras [phone]  [facility] [address]  Phone: [phone] Fax: [phone]

## 2023-04-04 DIAGNOSIS — F90.0 ADHD, PREDOMINANTLY INATTENTIVE TYPE: ICD-10-CM

## 2023-04-04 NOTE — TELEPHONE ENCOUNTER
Medication:   Requested Prescriptions     Pending Prescriptions Disp Refills    amphetamine-dextroamphetamine (ADDERALL, 20MG,) 20 MG tablet 90 tablet 0     Sig: Take 1 tablet by mouth daily for 90 days. Last Filled:      Patient Phone Number: [phone] (home) [phone] (work)    Last appt: 10/18/2022   Next appt: Visit date not found    Last OARRS:   RX Monitoring 3/11/2019   Attestation The Prescription Monitoring Report for this patient was reviewed today. Periodic Controlled Substance Monitoring No signs of potential drug abuse or diversion identified: otherwise, see note documentation       Preferred Pharmacy:   [address] [place] [address] [phone]  [facility]  Phone: [phone] Fax: [phone]    Rowena Garcia [address] [place] [address] [phone]  [address] [place] [id number]  Phone: [phone] Fax: [phone]    [facility]/pharmacy #[id number]- [address] [facility], [address] [place] [address] [phone]  [address] Eric Amaro [address]  Phone: [phone] Fax: [phone]    [facility] [id number] Claudetta Hams, [address] [phone] Mikaela An [phone]  [facility] [address] [address]  Phone: [phone] Fax: [phone]  Medication:   Requested Prescriptions     Pending Prescriptions Disp Refills    amphetamine-dextroamphetamine (ADDERALL, 20MG,) 20 MG tablet 90 tablet 0     Sig: Take 1 tablet by mouth daily for 90 days. Last Filled:      Patient Phone Number: [phone] (home) [phone] (work)    Last appt: 10/18/2022   Next appt: Visit date not found    Last OARRS:   RX Monitoring 3/11/2019   Attestation The Prescription Monitoring Report for this patient was reviewed today.    Periodic Controlled Substance Monitoring No signs of potential drug abuse or diversion identified:

## 2023-04-05 DIAGNOSIS — F90.2 ATTENTION DEFICIT HYPERACTIVITY DISORDER (ADHD), COMBINED TYPE: Primary | ICD-10-CM

## 2023-04-05 RX ORDER — DEXTROAMPHETAMINE SACCHARATE, AMPHETAMINE ASPARTATE, DEXTROAMPHETAMINE SULFATE AND AMPHETAMINE SULFATE 5; 5; 5; 5 MG/1; MG/1; MG/1; MG/1
20 TABLET ORAL DAILY
Qty: 90 TABLET | Refills: 0 | Status: SHIPPED | OUTPATIENT
Start: 2023-04-05 | End: 2023-04-06 | Stop reason: SDUPTHER

## 2023-04-05 RX ORDER — DEXTROAMPHETAMINE SACCHARATE, AMPHETAMINE ASPARTATE MONOHYDRATE, DEXTROAMPHETAMINE SULFATE AND AMPHETAMINE SULFATE 7.5; 7.5; 7.5; 7.5 MG/1; MG/1; MG/1; MG/1
30 CAPSULE, EXTENDED RELEASE ORAL DAILY
Qty: 30 CAPSULE | Refills: 0 | Status: SHIPPED | OUTPATIENT
Start: 2023-04-05 | End: 2023-04-06 | Stop reason: SDUPTHER

## 2023-04-06 ENCOUNTER — TELEPHONE (OUTPATIENT)
Dept: PRIMARY CARE CLINIC | Age: 53
End: 2023-04-06

## 2023-04-06 DIAGNOSIS — F90.2 ATTENTION DEFICIT HYPERACTIVITY DISORDER (ADHD), COMBINED TYPE: ICD-10-CM

## 2023-04-06 DIAGNOSIS — F90.0 ADHD, PREDOMINANTLY INATTENTIVE TYPE: ICD-10-CM

## 2023-04-06 RX ORDER — DEXTROAMPHETAMINE SACCHARATE, AMPHETAMINE ASPARTATE MONOHYDRATE, DEXTROAMPHETAMINE SULFATE AND AMPHETAMINE SULFATE 7.5; 7.5; 7.5; 7.5 MG/1; MG/1; MG/1; MG/1
30 CAPSULE, EXTENDED RELEASE ORAL DAILY
Qty: 30 CAPSULE | Refills: 0 | Status: SHIPPED | OUTPATIENT
Start: 2023-04-06 | End: 2023-05-06

## 2023-04-06 RX ORDER — DEXTROAMPHETAMINE SACCHARATE, AMPHETAMINE ASPARTATE, DEXTROAMPHETAMINE SULFATE AND AMPHETAMINE SULFATE 5; 5; 5; 5 MG/1; MG/1; MG/1; MG/1
20 TABLET ORAL DAILY
Qty: 90 TABLET | Refills: 0 | Status: SHIPPED | OUTPATIENT
Start: 2023-04-06 | End: 2023-07-05

## 2023-04-06 NOTE — TELEPHONE ENCOUNTER
Pt states the meds adderall is out at her pharmacy. Can Dr Ita Dillon pls resend scripts to   [facility]. # [phone]    Pt states she needs done [facility] pharmacy is running out.

## 2023-04-06 NOTE — TELEPHONE ENCOUNTER
If she needs this sent anywhere else she will need to  paper prescription, and I need to see her in clinic for appt

## 2023-05-16 ENCOUNTER — HOSPITAL ENCOUNTER (OUTPATIENT)
Dept: MAMMOGRAPHY | Age: 53
Discharge: HOME | End: 2023-05-16
Payer: COMMERCIAL

## 2023-05-16 VITALS — WEIGHT: 160 LBS | HEIGHT: 70 IN | BODY MASS INDEX: 22.9 KG/M2

## 2023-05-16 DIAGNOSIS — Z12.31 VISIT FOR SCREENING MAMMOGRAM: ICD-10-CM

## 2023-05-16 PROCEDURE — 77063 BREAST TOMOSYNTHESIS BI: CPT

## 2023-06-08 ENCOUNTER — OFFICE VISIT (OUTPATIENT)
Dept: PRIMARY CARE CLINIC | Age: 53
End: 2023-06-08
Payer: COMMERCIAL

## 2023-06-08 VITALS
DIASTOLIC BLOOD PRESSURE: 74 MMHG | OXYGEN SATURATION: 98 % | TEMPERATURE: 97.1 F | BODY MASS INDEX: 23.65 KG/M2 | HEART RATE: 63 BPM | WEIGHT: 164.8 LBS | SYSTOLIC BLOOD PRESSURE: 128 MMHG

## 2023-06-08 DIAGNOSIS — N30.01 ACUTE CYSTITIS WITH HEMATURIA: Primary | ICD-10-CM

## 2023-06-08 DIAGNOSIS — F90.2 ATTENTION DEFICIT HYPERACTIVITY DISORDER (ADHD), COMBINED TYPE: ICD-10-CM

## 2023-06-08 LAB
BILIRUBIN, POC: NORMAL
BLOOD URINE, POC: NORMAL
CLARITY, POC: CLEAR
COLOR, POC: NORMAL
GLUCOSE URINE, POC: NORMAL
KETONES, POC: NORMAL
LEUKOCYTE EST, POC: NORMAL
NITRITE, POC: POSITIVE
PH, POC: 5.5
PROTEIN, POC: NORMAL
SPECIFIC GRAVITY, POC: 1.03
UROBILINOGEN, POC: 0.2

## 2023-06-08 PROCEDURE — 99213 OFFICE O/P EST LOW 20 MIN: CPT | Performed by: STUDENT IN AN ORGANIZED HEALTH CARE EDUCATION/TRAINING PROGRAM

## 2023-06-08 PROCEDURE — 81002 URINALYSIS NONAUTO W/O SCOPE: CPT | Performed by: STUDENT IN AN ORGANIZED HEALTH CARE EDUCATION/TRAINING PROGRAM

## 2023-06-08 RX ORDER — DEXTROAMPHETAMINE SACCHARATE, AMPHETAMINE ASPARTATE, DEXTROAMPHETAMINE SULFATE AND AMPHETAMINE SULFATE 5; 5; 5; 5 MG/1; MG/1; MG/1; MG/1
20 TABLET ORAL DAILY
Qty: 30 TABLET | Refills: 0 | Status: SHIPPED | OUTPATIENT
Start: 2023-07-08 | End: 2023-08-07

## 2023-06-08 RX ORDER — NITROFURANTOIN 25; 75 MG/1; MG/1
100 CAPSULE ORAL 2 TIMES DAILY
Qty: 10 CAPSULE | Refills: 0 | Status: SHIPPED | OUTPATIENT
Start: 2023-06-08 | End: 2023-06-13

## 2023-06-08 RX ORDER — DEXTROAMPHETAMINE SACCHARATE, AMPHETAMINE ASPARTATE, DEXTROAMPHETAMINE SULFATE AND AMPHETAMINE SULFATE 5; 5; 5; 5 MG/1; MG/1; MG/1; MG/1
20 TABLET ORAL DAILY
Qty: 30 TABLET | Refills: 0 | Status: SHIPPED | OUTPATIENT
Start: 2023-08-08 | End: 2023-06-08

## 2023-06-08 RX ORDER — DEXTROAMPHETAMINE SACCHARATE, AMPHETAMINE ASPARTATE, DEXTROAMPHETAMINE SULFATE AND AMPHETAMINE SULFATE 5; 5; 5; 5 MG/1; MG/1; MG/1; MG/1
20 TABLET ORAL DAILY
Qty: 30 TABLET | Refills: 0 | Status: SHIPPED | OUTPATIENT
Start: 2023-06-08 | End: 2023-07-08

## 2023-06-08 RX ORDER — DEXTROAMPHETAMINE SACCHARATE, AMPHETAMINE ASPARTATE MONOHYDRATE, DEXTROAMPHETAMINE SULFATE AND AMPHETAMINE SULFATE 7.5; 7.5; 7.5; 7.5 MG/1; MG/1; MG/1; MG/1
30 CAPSULE, EXTENDED RELEASE ORAL DAILY
Qty: 30 CAPSULE | Refills: 0 | Status: SHIPPED | OUTPATIENT
Start: 2023-07-09 | End: 2023-06-08

## 2023-06-08 RX ORDER — DEXTROAMPHETAMINE SACCHARATE, AMPHETAMINE ASPARTATE MONOHYDRATE, DEXTROAMPHETAMINE SULFATE AND AMPHETAMINE SULFATE 7.5; 7.5; 7.5; 7.5 MG/1; MG/1; MG/1; MG/1
30 CAPSULE, EXTENDED RELEASE ORAL DAILY
Qty: 30 CAPSULE | Refills: 0 | Status: SHIPPED | OUTPATIENT
Start: 2023-08-08 | End: 2023-09-07

## 2023-06-08 RX ORDER — DEXTROAMPHETAMINE SACCHARATE, AMPHETAMINE ASPARTATE MONOHYDRATE, DEXTROAMPHETAMINE SULFATE AND AMPHETAMINE SULFATE 7.5; 7.5; 7.5; 7.5 MG/1; MG/1; MG/1; MG/1
30 CAPSULE, EXTENDED RELEASE ORAL DAILY
Qty: 30 CAPSULE | Refills: 0 | Status: SHIPPED | OUTPATIENT
Start: 2023-07-09 | End: 2023-08-08

## 2023-06-08 RX ORDER — DEXTROAMPHETAMINE SACCHARATE, AMPHETAMINE ASPARTATE MONOHYDRATE, DEXTROAMPHETAMINE SULFATE AND AMPHETAMINE SULFATE 7.5; 7.5; 7.5; 7.5 MG/1; MG/1; MG/1; MG/1
30 CAPSULE, EXTENDED RELEASE ORAL DAILY
Qty: 30 CAPSULE | Refills: 0 | Status: SHIPPED | OUTPATIENT
Start: 2023-06-08 | End: 2023-07-08

## 2023-06-08 SDOH — ECONOMIC STABILITY: INCOME INSECURITY: HOW HARD IS IT FOR YOU TO PAY FOR THE VERY BASICS LIKE FOOD, HOUSING, MEDICAL CARE, AND HEATING?: NOT VERY HARD

## 2023-06-08 SDOH — ECONOMIC STABILITY: HOUSING INSECURITY
IN THE LAST 12 MONTHS, WAS THERE A TIME WHEN YOU DID NOT HAVE A STEADY PLACE TO SLEEP OR SLEPT IN A SHELTER (INCLUDING NOW)?: NO

## 2023-06-08 SDOH — ECONOMIC STABILITY: FOOD INSECURITY: WITHIN THE PAST 12 MONTHS, YOU WORRIED THAT YOUR FOOD WOULD RUN OUT BEFORE YOU GOT MONEY TO BUY MORE.: NEVER TRUE

## 2023-06-08 SDOH — ECONOMIC STABILITY: TRANSPORTATION INSECURITY
IN THE PAST 12 MONTHS, HAS LACK OF TRANSPORTATION KEPT YOU FROM MEETINGS, WORK, OR FROM GETTING THINGS NEEDED FOR DAILY LIVING?: NO

## 2023-06-08 SDOH — ECONOMIC STABILITY: FOOD INSECURITY: WITHIN THE PAST 12 MONTHS, THE FOOD YOU BOUGHT JUST DIDN'T LAST AND YOU DIDN'T HAVE MONEY TO GET MORE.: NEVER TRUE

## 2023-06-08 ASSESSMENT — PATIENT HEALTH QUESTIONNAIRE - PHQ9
SUM OF ALL RESPONSES TO PHQ9 QUESTIONS 1 & 2: 0
SUM OF ALL RESPONSES TO PHQ QUESTIONS 1-9: 0
SUM OF ALL RESPONSES TO PHQ QUESTIONS 1-9: 0
2. FEELING DOWN, DEPRESSED OR HOPELESS: 0
SUM OF ALL RESPONSES TO PHQ QUESTIONS 1-9: 0
SUM OF ALL RESPONSES TO PHQ9 QUESTIONS 1 & 2: 0
2. FEELING DOWN, DEPRESSED OR HOPELESS: NOT AT ALL
1. LITTLE INTEREST OR PLEASURE IN DOING THINGS: 0
1. LITTLE INTEREST OR PLEASURE IN DOING THINGS: NOT AT ALL
SUM OF ALL RESPONSES TO PHQ QUESTIONS 1-9: 0

## 2023-06-08 NOTE — PROGRESS NOTES
Hari Haney (:  1970) is a 47 y.o. female,Established patient, here for evaluation of the following chief complaint(s):  Urinary Tract Infection (Suspected UTI)         ASSESSMENT/PLAN:  1. Acute cystitis with hematuria  Point-of-care UA positive nitrites and moderate blood  -     POCT Urinalysis no Micro  -     nitrofurantoin, macrocrystal-monohydrate, (MACROBID) 100 MG capsule; Take 1 capsule by mouth 2 times daily for 5 days, Disp-10 capsule, R-0Normal  Send Urine culture  2. Attention deficit hyperactivity disorder (ADHD), combined type  Chronic well-controlled  -     amphetamine-dextroamphetamine (ADDERALL XR) 30 MG extended release capsule; Take 1 capsule by mouth daily for 30 days. Max Daily Amount: 30 mg, Disp-30 capsule, R-0Print  -     amphetamine-dextroamphetamine (ADDERALL, 20MG,) 20 MG tablet; Take 1 tablet by mouth daily for 30 days. Max Daily Amount: 20 mg, Disp-30 tablet, R-0Print  -     amphetamine-dextroamphetamine (ADDERALL XR) 30 MG extended release capsule; Take 1 capsule by mouth daily for 30 days. Max Daily Amount: 30 mg, Disp-30 capsule, R-0Print  -     amphetamine-dextroamphetamine (ADDERALL, 20MG,) 20 MG tablet; Take 1 tablet by mouth daily for 30 days. Max Daily Amount: 20 mg, Disp-30 tablet, R-0Print  -     amphetamine-dextroamphetamine (ADDERALL, 20MG,) 20 MG tablet; Take 1 tablet by mouth daily for 30 days. Max Daily Amount: 20 mg, Disp-30 tablet, R-0Print  -     amphetamine-dextroamphetamine (ADDERALL XR) 30 MG extended release capsule; Take 1 capsule by mouth daily for 30 days. Max Daily Amount: 30 mg, Disp-30 capsule, R-0Print      Return if symptoms worsen or fail to improve. Subjective   SUBJECTIVE/OBJECTIVE:  HPI  Dysuria for a couple of days    No hematuria or flank pain no fevers    No chest pain, shortness of breath, palpitations, weightloss, irritability, insomnia, headaches or lightheadedness.     Review of Systems   All other systems reviewed and are

## 2023-06-11 LAB
BACTERIA UR CULT: ABNORMAL
ORGANISM: ABNORMAL

## 2023-09-22 DIAGNOSIS — F90.2 ATTENTION DEFICIT HYPERACTIVITY DISORDER (ADHD), COMBINED TYPE: ICD-10-CM

## 2023-09-22 RX ORDER — DEXTROAMPHETAMINE SACCHARATE, AMPHETAMINE ASPARTATE, DEXTROAMPHETAMINE SULFATE AND AMPHETAMINE SULFATE 5; 5; 5; 5 MG/1; MG/1; MG/1; MG/1
20 TABLET ORAL DAILY
Qty: 30 TABLET | Refills: 0 | Status: CANCELLED | OUTPATIENT
Start: 2023-09-22 | End: 2023-10-22

## 2023-09-22 RX ORDER — DEXTROAMPHETAMINE SACCHARATE, AMPHETAMINE ASPARTATE, DEXTROAMPHETAMINE SULFATE AND AMPHETAMINE SULFATE 5; 5; 5; 5 MG/1; MG/1; MG/1; MG/1
20 TABLET ORAL DAILY
Qty: 30 TABLET | Refills: 0 | Status: SHIPPED | OUTPATIENT
Start: 2023-09-22 | End: 2023-10-22

## 2023-09-22 NOTE — TELEPHONE ENCOUNTER
Medication:   Requested Prescriptions     Pending Prescriptions Disp Refills    amphetamine-dextroamphetamine (ADDERALL, 20MG,) 20 MG tablet 30 tablet 0     Sig: Take 1 tablet by mouth daily for 30 days. Max Daily Amount: 20 mg     Last Filled:  6/8/2023    Last appt: 6/8/2023   Next appt: Visit date not found    Pt sent message requesting a refill. She stated her mom is in hospice and she will make an appointment next week. Last OARRS:       3/11/2019     9:09 PM   RX Monitoring   Attestation The Prescription Monitoring Report for this patient was reviewed today.    Periodic Controlled Substance Monitoring No signs of potential drug abuse or diversion identified: otherwise, see note documentation

## 2023-10-12 DIAGNOSIS — F90.2 ATTENTION DEFICIT HYPERACTIVITY DISORDER (ADHD), COMBINED TYPE: ICD-10-CM

## 2023-10-12 NOTE — TELEPHONE ENCOUNTER
Medication Refill  amphetamine-dextroamphetamine (ADDERALL XR) 30 MG extended release capsule    Last OV - 6/8/2023   *OV scheduled Monday 10/16/2023  *Please print prescription Order - patient will pick-up  *Please contact patient when Order is ready for pick-up

## 2023-10-12 NOTE — TELEPHONE ENCOUNTER
Medication:   Requested Prescriptions     Pending Prescriptions Disp Refills    amphetamine-dextroamphetamine (ADDERALL, 20MG,) 20 MG tablet 30 tablet 0     Sig: Take 1 tablet by mouth daily for 30 days. Max Daily Amount: 20 mg     Last Filled:  9.22.23    Last appt: 6/8/2023   Next appt: 10/16/2023    Last OARRS:       3/11/2019     9:09 PM   RX Monitoring   Attestation The Prescription Monitoring Report for this patient was reviewed today.    Periodic Controlled Substance Monitoring No signs of potential drug abuse or diversion identified: otherwise, see note documentation

## 2023-10-13 DIAGNOSIS — F90.2 ATTENTION DEFICIT HYPERACTIVITY DISORDER (ADHD), COMBINED TYPE: ICD-10-CM

## 2023-10-13 RX ORDER — DEXTROAMPHETAMINE SACCHARATE, AMPHETAMINE ASPARTATE MONOHYDRATE, DEXTROAMPHETAMINE SULFATE AND AMPHETAMINE SULFATE 7.5; 7.5; 7.5; 7.5 MG/1; MG/1; MG/1; MG/1
30 CAPSULE, EXTENDED RELEASE ORAL DAILY
Qty: 30 CAPSULE | Refills: 0 | Status: SHIPPED | OUTPATIENT
Start: 2023-10-13 | End: 2023-11-12

## 2023-10-13 RX ORDER — DEXTROAMPHETAMINE SACCHARATE, AMPHETAMINE ASPARTATE, DEXTROAMPHETAMINE SULFATE AND AMPHETAMINE SULFATE 5; 5; 5; 5 MG/1; MG/1; MG/1; MG/1
20 TABLET ORAL DAILY
Qty: 30 TABLET | Refills: 0 | OUTPATIENT
Start: 2023-10-13 | End: 2023-11-12

## 2023-10-13 NOTE — TELEPHONE ENCOUNTER
Medication:   Requested Prescriptions     Pending Prescriptions Disp Refills    amphetamine-dextroamphetamine (ADDERALL XR) 30 MG extended release capsule 30 capsule 0     Sig: Take 1 capsule by mouth daily for 30 days. Max Daily Amount: 30 mg     Last Filled:  9/22/2023    Last appt: 6/8/2023   Next appt: 10/16/2023    Last OARRS:       3/11/2019     9:09 PM   RX Monitoring   Attestation The Prescription Monitoring Report for this patient was reviewed today.    Periodic Controlled Substance Monitoring No signs of potential drug abuse or diversion identified: otherwise, see note documentation

## 2023-11-10 ENCOUNTER — OFFICE VISIT (OUTPATIENT)
Dept: PRIMARY CARE CLINIC | Age: 53
End: 2023-11-10

## 2023-11-10 VITALS
DIASTOLIC BLOOD PRESSURE: 86 MMHG | OXYGEN SATURATION: 96 % | TEMPERATURE: 97.3 F | HEIGHT: 70 IN | BODY MASS INDEX: 23.48 KG/M2 | HEART RATE: 77 BPM | SYSTOLIC BLOOD PRESSURE: 124 MMHG | WEIGHT: 164 LBS | RESPIRATION RATE: 16 BRPM

## 2023-11-10 DIAGNOSIS — Z23 NEED FOR TDAP VACCINATION: ICD-10-CM

## 2023-11-10 DIAGNOSIS — Z23 NEED FOR SHINGLES VACCINE: ICD-10-CM

## 2023-11-10 DIAGNOSIS — Z87.891 FORMER SMOKER: ICD-10-CM

## 2023-11-10 DIAGNOSIS — Z12.11 SCREEN FOR COLON CANCER: ICD-10-CM

## 2023-11-10 DIAGNOSIS — F90.2 ATTENTION DEFICIT HYPERACTIVITY DISORDER (ADHD), COMBINED TYPE: Primary | ICD-10-CM

## 2023-11-10 RX ORDER — DEXTROAMPHETAMINE SACCHARATE, AMPHETAMINE ASPARTATE MONOHYDRATE, DEXTROAMPHETAMINE SULFATE AND AMPHETAMINE SULFATE 7.5; 7.5; 7.5; 7.5 MG/1; MG/1; MG/1; MG/1
30 CAPSULE, EXTENDED RELEASE ORAL DAILY
Qty: 30 CAPSULE | Refills: 0 | Status: SHIPPED | OUTPATIENT
Start: 2023-11-10 | End: 2023-12-10

## 2023-11-10 RX ORDER — DEXTROAMPHETAMINE SACCHARATE, AMPHETAMINE ASPARTATE MONOHYDRATE, DEXTROAMPHETAMINE SULFATE AND AMPHETAMINE SULFATE 7.5; 7.5; 7.5; 7.5 MG/1; MG/1; MG/1; MG/1
30 CAPSULE, EXTENDED RELEASE ORAL DAILY
Qty: 30 CAPSULE | Refills: 0 | Status: SHIPPED | OUTPATIENT
Start: 2024-01-10 | End: 2024-02-09

## 2023-11-10 RX ORDER — DEXTROAMPHETAMINE SACCHARATE, AMPHETAMINE ASPARTATE, DEXTROAMPHETAMINE SULFATE AND AMPHETAMINE SULFATE 5; 5; 5; 5 MG/1; MG/1; MG/1; MG/1
20 TABLET ORAL DAILY
Qty: 30 TABLET | Refills: 0 | Status: SHIPPED | OUTPATIENT
Start: 2024-01-10 | End: 2024-02-09

## 2023-11-10 RX ORDER — DEXTROAMPHETAMINE SACCHARATE, AMPHETAMINE ASPARTATE, DEXTROAMPHETAMINE SULFATE AND AMPHETAMINE SULFATE 5; 5; 5; 5 MG/1; MG/1; MG/1; MG/1
20 TABLET ORAL DAILY
Qty: 30 TABLET | Refills: 0 | Status: SHIPPED | OUTPATIENT
Start: 2023-11-10 | End: 2023-12-10

## 2023-11-10 RX ORDER — DEXTROAMPHETAMINE SACCHARATE, AMPHETAMINE ASPARTATE, DEXTROAMPHETAMINE SULFATE AND AMPHETAMINE SULFATE 5; 5; 5; 5 MG/1; MG/1; MG/1; MG/1
20 TABLET ORAL DAILY
Qty: 30 TABLET | Refills: 0 | Status: SHIPPED | OUTPATIENT
Start: 2023-12-10 | End: 2024-01-09

## 2023-11-10 RX ORDER — DEXTROAMPHETAMINE SACCHARATE, AMPHETAMINE ASPARTATE MONOHYDRATE, DEXTROAMPHETAMINE SULFATE AND AMPHETAMINE SULFATE 7.5; 7.5; 7.5; 7.5 MG/1; MG/1; MG/1; MG/1
30 CAPSULE, EXTENDED RELEASE ORAL DAILY
Qty: 30 CAPSULE | Refills: 0 | Status: SHIPPED | OUTPATIENT
Start: 2023-12-10 | End: 2024-01-09

## 2023-12-15 ENCOUNTER — PATIENT MESSAGE (OUTPATIENT)
Dept: PRIMARY CARE CLINIC | Age: 53
End: 2023-12-15

## 2023-12-15 DIAGNOSIS — F90.2 ATTENTION DEFICIT HYPERACTIVITY DISORDER (ADHD), COMBINED TYPE: ICD-10-CM

## 2023-12-15 RX ORDER — DEXTROAMPHETAMINE SACCHARATE, AMPHETAMINE ASPARTATE MONOHYDRATE, DEXTROAMPHETAMINE SULFATE AND AMPHETAMINE SULFATE 7.5; 7.5; 7.5; 7.5 MG/1; MG/1; MG/1; MG/1
30 CAPSULE, EXTENDED RELEASE ORAL DAILY
Qty: 30 CAPSULE | Refills: 0 | Status: SHIPPED | OUTPATIENT
Start: 2023-12-15 | End: 2024-01-14

## 2023-12-15 RX ORDER — DEXTROAMPHETAMINE SACCHARATE, AMPHETAMINE ASPARTATE, DEXTROAMPHETAMINE SULFATE AND AMPHETAMINE SULFATE 5; 5; 5; 5 MG/1; MG/1; MG/1; MG/1
20 TABLET ORAL DAILY
Qty: 30 TABLET | Refills: 0 | Status: SHIPPED | OUTPATIENT
Start: 2024-01-10 | End: 2024-02-09

## 2023-12-15 NOTE — TELEPHONE ENCOUNTER
From: Lorne Brittle  To: Dr. Mirela Lopez: 12/15/2023 7:34 AM EST  Subject: Medication    Hi Dr. Sulma Crenshaw! I saw you in the office last month and you gave me 2 hard copy prescriptions (Adderall 20mg and Adderall XR 30 mg) and then sent the next 2 months to my pharmacy ([facility]). I went to fill for December and they were permanently closing the [facility] on [address] The pharmacist told me I would have to ask you to cancel those and send them somewhere else. Could you resubmit the December and January prescriptions to [facility] [place], [address])? Thank you!   Giovanni Jang

## 2024-01-03 NOTE — ASSESSMENT & PLAN NOTE
Chronic stable  Already sent in medication couple days ago  Follow-up in 3 months Detail Level: Detailed Depth Of Biopsy: dermis Was A Bandage Applied: Yes Size Of Lesion In Cm: 0.4 X Size Of Lesion In Cm: 0.3 Biopsy Type: H and E Biopsy Method: Dermablade Anesthesia Type: 1% lidocaine without epinephrine Anesthesia Volume In Cc: 0.5 Additional Anesthesia Volume In Cc (Will Not Render If 0): 0 Hemostasis: Drysol Wound Care: Petrolatum Dressing: bandage Destruction After The Procedure: No Type Of Destruction Used: Curettage Curettage Text: The wound bed was treated with curettage after the biopsy was performed. Cryotherapy Text: The wound bed was treated with cryotherapy after the biopsy was performed. Electrodesiccation Text: The wound bed was treated with electrodesiccation after the biopsy was performed. Electrodesiccation And Curettage Text: The wound bed was treated with electrodesiccation and curettage after the biopsy was performed. Silver Nitrate Text: The wound bed was treated with silver nitrate after the biopsy was performed. Lab: [id number] Lab Facility: [id number] Triangulation (Location Of Lesion In Relation To Distance From Anatomical Landmarks): 0.4 x 0.3 cm Consent: Verbal consent was obtained and risks were reviewed including but not limited to scarring, infection, bleeding, scabbing, incomplete removal, nerve damage and allergy to anesthesia. Post-Care Instructions: I reviewed with the patient in detail post-care instructions. Patient is to keep the biopsy site dry overnight, and then clean with mild soap and water, apply vaseline or aquaphor, and cover with bandage daily until healed. Patient may do white vinegar soaks to remove any crusting. Notification Instructions: Patient will be notified of biopsy results. However, patient instructed to call the office if not contacted within 2 weeks. Billing Type: Third-Party Bill Information: Selecting Yes will display possible errors in your note based on the variables you have selected. This validation is only offered as a suggestion for you. PLEASE NOTE THAT THE VALIDATION TEXT WILL BE REMOVED WHEN YOU FINALIZE YOUR NOTE. IF YOU WANT TO FAX A PRELIMINARY NOTE YOU WILL NEED TO TOGGLE THIS TO 'NO' IF YOU DO NOT WANT IT IN YOUR FAXED NOTE.

## 2024-02-01 DIAGNOSIS — F90.2 ATTENTION DEFICIT HYPERACTIVITY DISORDER (ADHD), COMBINED TYPE: ICD-10-CM

## 2024-02-01 NOTE — TELEPHONE ENCOUNTER
Patient comment: Hi Dr. Bulmaro Juares still does not have the 30 mg XR script for Jan. I was supposed to refill last week and they said they did not receive a script for 30 mg XR, only for the 20 mg (which I filled a few weeks ago. Can you please send it? Thank you!

## 2024-02-02 RX ORDER — DEXTROAMPHETAMINE SACCHARATE, AMPHETAMINE ASPARTATE MONOHYDRATE, DEXTROAMPHETAMINE SULFATE AND AMPHETAMINE SULFATE 7.5; 7.5; 7.5; 7.5 MG/1; MG/1; MG/1; MG/1
30 CAPSULE, EXTENDED RELEASE ORAL DAILY
Qty: 30 CAPSULE | Refills: 0 | Status: SHIPPED | OUTPATIENT
Start: 2024-02-02 | End: 2024-03-03

## 2024-03-08 DIAGNOSIS — F90.2 ATTENTION DEFICIT HYPERACTIVITY DISORDER (ADHD), COMBINED TYPE: ICD-10-CM

## 2024-03-08 RX ORDER — DEXTROAMPHETAMINE SACCHARATE, AMPHETAMINE ASPARTATE MONOHYDRATE, DEXTROAMPHETAMINE SULFATE AND AMPHETAMINE SULFATE 7.5; 7.5; 7.5; 7.5 MG/1; MG/1; MG/1; MG/1
30 CAPSULE, EXTENDED RELEASE ORAL DAILY
Qty: 30 CAPSULE | Refills: 0 | Status: SHIPPED | OUTPATIENT
Start: 2024-03-08 | End: 2024-04-07

## 2024-03-08 NOTE — TELEPHONE ENCOUNTER
Medication:   Requested Prescriptions     Pending Prescriptions Disp Refills    amphetamine-dextroamphetamine (ADDERALL XR) 30 MG extended release capsule 30 capsule 0     Sig: Take 1 capsule by mouth daily for 30 days. Max Daily Amount: 30 mg     Last Filled:  2.2.24    Last appt: 11/10/2023   Next appt: 3/12/2024    Last OARRS:       3/11/2019     9:09 PM   RX Monitoring   Attestation The Prescription Monitoring Report for this patient was reviewed today.   Periodic Controlled Substance Monitoring No signs of potential drug abuse or diversion identified: otherwise, see note documentation

## 2024-03-12 ENCOUNTER — OFFICE VISIT (OUTPATIENT)
Dept: PRIMARY CARE CLINIC | Age: 54
End: 2024-03-12
Payer: COMMERCIAL

## 2024-03-12 VITALS
WEIGHT: 162.4 LBS | DIASTOLIC BLOOD PRESSURE: 74 MMHG | HEART RATE: 75 BPM | BODY MASS INDEX: 23.25 KG/M2 | SYSTOLIC BLOOD PRESSURE: 118 MMHG | OXYGEN SATURATION: 99 % | HEIGHT: 70 IN | TEMPERATURE: 97.6 F

## 2024-03-12 DIAGNOSIS — F90.0 ADHD, PREDOMINANTLY INATTENTIVE TYPE: Primary | ICD-10-CM

## 2024-03-12 PROCEDURE — 99213 OFFICE O/P EST LOW 20 MIN: CPT | Performed by: STUDENT IN AN ORGANIZED HEALTH CARE EDUCATION/TRAINING PROGRAM

## 2024-03-12 RX ORDER — DEXTROAMPHETAMINE SACCHARATE, AMPHETAMINE ASPARTATE MONOHYDRATE, DEXTROAMPHETAMINE SULFATE AND AMPHETAMINE SULFATE 7.5; 7.5; 7.5; 7.5 MG/1; MG/1; MG/1; MG/1
30 CAPSULE, EXTENDED RELEASE ORAL DAILY
Qty: 30 CAPSULE | Refills: 0 | Status: SHIPPED | OUTPATIENT
Start: 2024-05-08 | End: 2024-06-07

## 2024-03-12 RX ORDER — DEXTROAMPHETAMINE SACCHARATE, AMPHETAMINE ASPARTATE MONOHYDRATE, DEXTROAMPHETAMINE SULFATE AND AMPHETAMINE SULFATE 7.5; 7.5; 7.5; 7.5 MG/1; MG/1; MG/1; MG/1
30 CAPSULE, EXTENDED RELEASE ORAL DAILY
Qty: 30 CAPSULE | Refills: 0 | Status: SHIPPED | OUTPATIENT
Start: 2024-04-08 | End: 2024-05-08

## 2024-03-12 RX ORDER — DEXTROAMPHETAMINE SACCHARATE, AMPHETAMINE ASPARTATE, DEXTROAMPHETAMINE SULFATE AND AMPHETAMINE SULFATE 5; 5; 5; 5 MG/1; MG/1; MG/1; MG/1
20 TABLET ORAL DAILY
Qty: 30 TABLET | Refills: 0 | Status: SHIPPED | OUTPATIENT
Start: 2024-05-21 | End: 2024-06-20

## 2024-03-12 RX ORDER — DEXTROAMPHETAMINE SACCHARATE, AMPHETAMINE ASPARTATE, DEXTROAMPHETAMINE SULFATE AND AMPHETAMINE SULFATE 5; 5; 5; 5 MG/1; MG/1; MG/1; MG/1
20 TABLET ORAL DAILY
Qty: 30 TABLET | Refills: 0 | Status: SHIPPED | OUTPATIENT
Start: 2024-03-21 | End: 2024-04-20

## 2024-03-12 RX ORDER — DEXTROAMPHETAMINE SACCHARATE, AMPHETAMINE ASPARTATE, DEXTROAMPHETAMINE SULFATE AND AMPHETAMINE SULFATE 5; 5; 5; 5 MG/1; MG/1; MG/1; MG/1
20 TABLET ORAL DAILY
Qty: 30 TABLET | Refills: 0 | Status: SHIPPED | OUTPATIENT
Start: 2024-04-20 | End: 2024-05-20

## 2024-03-12 RX ORDER — DEXTROAMPHETAMINE SACCHARATE, AMPHETAMINE ASPARTATE MONOHYDRATE, DEXTROAMPHETAMINE SULFATE AND AMPHETAMINE SULFATE 7.5; 7.5; 7.5; 7.5 MG/1; MG/1; MG/1; MG/1
30 CAPSULE, EXTENDED RELEASE ORAL DAILY
Qty: 30 CAPSULE | Refills: 0 | Status: SHIPPED | OUTPATIENT
Start: 2024-06-07 | End: 2024-07-07

## 2024-03-12 ASSESSMENT — PATIENT HEALTH QUESTIONNAIRE - PHQ9
1. LITTLE INTEREST OR PLEASURE IN DOING THINGS: 0
SUM OF ALL RESPONSES TO PHQ QUESTIONS 1-9: 0
1. LITTLE INTEREST OR PLEASURE IN DOING THINGS: NOT AT ALL
2. FEELING DOWN, DEPRESSED OR HOPELESS: NOT AT ALL
SUM OF ALL RESPONSES TO PHQ9 QUESTIONS 1 & 2: 0
SUM OF ALL RESPONSES TO PHQ9 QUESTIONS 1 & 2: 0
2. FEELING DOWN, DEPRESSED OR HOPELESS: 0
SUM OF ALL RESPONSES TO PHQ QUESTIONS 1-9: 0

## 2024-03-12 NOTE — PROGRESS NOTES
Mucous membranes are moist.   Eyes:      Extraocular Movements: Extraocular movements intact.   Cardiovascular:      Rate and Rhythm: Normal rate and regular rhythm.      Heart sounds: Normal heart sounds. No murmur heard.     No friction rub. No gallop.   Pulmonary:      Effort: Pulmonary effort is normal.      Breath sounds: Normal breath sounds. No wheezing, rhonchi or rales.   Skin:     General: Skin is warm.   Neurological:      General: No focal deficit present.      Mental Status: She is alert.   Psychiatric:         Mood and Affect: Mood normal.           An electronic signature was used to authenticate this note.  -- Marylin Gamble MD

## 2024-03-16 LAB
6MAM UR QL: NOT DETECTED
7AMINOCLONAZEPAM UR QL: NOT DETECTED
A-OH ALPRAZ UR QL: NOT DETECTED
ALPHA-OH-MIDAZOLAM, URINE: NOT DETECTED
ALPRAZ UR QL: NOT DETECTED
AMPHET UR QL SCN: PRESENT
ANNOTATION COMMENT IMP: NORMAL
ANNOTATION COMMENT IMP: NORMAL
BARBITURATES UR QL: NEGATIVE
BUPRENORPHINE UR QL: NOT DETECTED
BZE UR QL: NEGATIVE
CARBOXYTHC UR QL: NEGATIVE
CARISOPRODOL UR QL: NEGATIVE
CLONAZEPAM UR QL: NOT DETECTED
CODEINE UR QL: NOT DETECTED
CREAT UR-MCNC: <20 MG/DL (ref 20–400)
DIAZEPAM UR QL: NOT DETECTED
ETHYL GLUCURONIDE UR QL: NEGATIVE
FENTANYL UR QL: NOT DETECTED
GABAPENTIN: NOT DETECTED
HYDROCODONE UR QL: NOT DETECTED
HYDROMORPHONE UR QL: NOT DETECTED
LORAZEPAM UR QL: NOT DETECTED
MDA UR QL: NOT DETECTED
MDEA UR QL: NOT DETECTED
MDMA UR QL: NOT DETECTED
MEPERIDINE UR QL: NOT DETECTED
METHADONE UR QL: NEGATIVE
METHAMPHET UR QL: NOT DETECTED
MIDAZOLAM UR QL SCN: NOT DETECTED
MORPHINE UR QL: NOT DETECTED
NALOXONE: NOT DETECTED
NORBUPRENORPHINE UR QL CFM: NOT DETECTED
NORDIAZEPAM UR QL: NOT DETECTED
NORFENTANYL UR QL: NOT DETECTED
NORHYDROCODONE UR QL CFM: NOT DETECTED
NOROXYCODONE UR QL CFM: NOT DETECTED
NOROXYMORPHONE, URINE: NOT DETECTED
OXAZEPAM UR QL: NOT DETECTED
OXYCODONE UR QL: NOT DETECTED
OXYMORPHONE UR QL: NOT DETECTED
PATHOLOGY STUDY: NORMAL
PCP UR QL: NEGATIVE
PHENTERMINE UR QL: NOT DETECTED
PPAA UR QL: NOT DETECTED
PREGABALIN: NOT DETECTED
SERVICE CMNT-IMP: NORMAL
TAPENTADOL UR QL SCN: NOT DETECTED
TAPENTADOL-O-SULFATE, URINE: NOT DETECTED
TEMAZEPAM UR QL: NOT DETECTED
TRAMADOL UR QL: NEGATIVE
ZOLPIDEM UR QL: NOT DETECTED

## 2024-07-23 ENCOUNTER — TELEMEDICINE (OUTPATIENT)
Dept: PRIMARY CARE CLINIC | Age: 54
End: 2024-07-23
Payer: COMMERCIAL

## 2024-07-23 DIAGNOSIS — F90.0 ADHD, PREDOMINANTLY INATTENTIVE TYPE: ICD-10-CM

## 2024-07-23 DIAGNOSIS — Z72.0 TOBACCO USE: Primary | ICD-10-CM

## 2024-07-23 PROCEDURE — 99213 OFFICE O/P EST LOW 20 MIN: CPT | Performed by: STUDENT IN AN ORGANIZED HEALTH CARE EDUCATION/TRAINING PROGRAM

## 2024-07-23 RX ORDER — NICOTINE 21 MG/24HR
1 PATCH, TRANSDERMAL 24 HOURS TRANSDERMAL EVERY 24 HOURS
Qty: 30 PATCH | Refills: 0 | Status: SHIPPED | OUTPATIENT
Start: 2024-07-23 | End: 2025-07-23

## 2024-07-23 RX ORDER — DEXTROAMPHETAMINE SACCHARATE, AMPHETAMINE ASPARTATE, DEXTROAMPHETAMINE SULFATE AND AMPHETAMINE SULFATE 5; 5; 5; 5 MG/1; MG/1; MG/1; MG/1
20 TABLET ORAL DAILY
Qty: 30 TABLET | Refills: 0 | Status: SHIPPED | OUTPATIENT
Start: 2024-08-22 | End: 2024-09-21

## 2024-07-23 RX ORDER — DEXTROAMPHETAMINE SACCHARATE, AMPHETAMINE ASPARTATE, DEXTROAMPHETAMINE SULFATE AND AMPHETAMINE SULFATE 5; 5; 5; 5 MG/1; MG/1; MG/1; MG/1
20 TABLET ORAL DAILY
Qty: 30 TABLET | Refills: 0 | Status: SHIPPED | OUTPATIENT
Start: 2024-07-23 | End: 2024-08-22

## 2024-07-23 RX ORDER — DEXTROAMPHETAMINE SACCHARATE, AMPHETAMINE ASPARTATE MONOHYDRATE, DEXTROAMPHETAMINE SULFATE AND AMPHETAMINE SULFATE 7.5; 7.5; 7.5; 7.5 MG/1; MG/1; MG/1; MG/1
30 CAPSULE, EXTENDED RELEASE ORAL DAILY
Qty: 30 CAPSULE | Refills: 0 | Status: SHIPPED | OUTPATIENT
Start: 2024-07-23 | End: 2024-08-22

## 2024-07-23 RX ORDER — DEXTROAMPHETAMINE SACCHARATE, AMPHETAMINE ASPARTATE MONOHYDRATE, DEXTROAMPHETAMINE SULFATE AND AMPHETAMINE SULFATE 7.5; 7.5; 7.5; 7.5 MG/1; MG/1; MG/1; MG/1
30 CAPSULE, EXTENDED RELEASE ORAL DAILY
Qty: 30 CAPSULE | Refills: 0 | Status: SHIPPED | OUTPATIENT
Start: 2024-09-21 | End: 2024-10-21

## 2024-07-23 RX ORDER — DEXTROAMPHETAMINE SACCHARATE, AMPHETAMINE ASPARTATE MONOHYDRATE, DEXTROAMPHETAMINE SULFATE AND AMPHETAMINE SULFATE 7.5; 7.5; 7.5; 7.5 MG/1; MG/1; MG/1; MG/1
30 CAPSULE, EXTENDED RELEASE ORAL DAILY
Qty: 30 CAPSULE | Refills: 0 | Status: SHIPPED | OUTPATIENT
Start: 2024-08-22 | End: 2024-09-21

## 2024-07-23 RX ORDER — DEXTROAMPHETAMINE SACCHARATE, AMPHETAMINE ASPARTATE, DEXTROAMPHETAMINE SULFATE AND AMPHETAMINE SULFATE 5; 5; 5; 5 MG/1; MG/1; MG/1; MG/1
20 TABLET ORAL DAILY
Qty: 30 TABLET | Refills: 0 | Status: SHIPPED | OUTPATIENT
Start: 2024-09-21 | End: 2024-10-21

## 2024-07-23 NOTE — PROGRESS NOTES
Sadie Moseley, was evaluated through a synchronous (real-time) audio-video encounter. The patient (or guardian if applicable) is aware that this is a billable service, which includes applicable co-pays. This Virtual Visit was conducted with patient's (and/or legal guardian's) consent. Patient identification was verified, and a caregiver was present when appropriate.   The patient was located at Home: [address] [place] [address]  Provider was located at Facility (Appt Dept): [address] [place], [place] [id number]  Confirm you are appropriately licensed, registered, or certified to deliver care in the state where the patient is located as indicated above. If you are not or unsure, please re-schedule the visit: Yes, I confirm.     Sadie Moseley (:  1970) is a Established patient, presenting virtually for evaluation of the following:    Assessment & Plan   Below is the assessment and plan developed based on review of pertinent history, physical exam, labs, studies, and medications.  1. Tobacco use  -     nicotine (NICODERM CQ) 21 MG/24HR; Place 1 patch onto the skin every 24 hours, Disp-30 patch, R-0Normal  2. ADHD, predominantly inattentive type  -     amphetamine-dextroamphetamine (ADDERALL) 20 MG tablet; Take 1 tablet by mouth daily for 30 days. Max Daily Amount: 20 mg, Disp-30 tablet, R-0Normal  -     amphetamine-dextroamphetamine (ADDERALL) 20 MG tablet; Take 1 tablet by mouth daily for 30 days. Max Daily Amount: 20 mg, Disp-30 tablet, R-0Normal  -     amphetamine-dextroamphetamine (ADDERALL) 20 MG tablet; Take 1 tablet by mouth daily for 30 days. Max Daily Amount: 20 mg, Disp-30 tablet, R-0Normal  -     amphetamine-dextroamphetamine (ADDERALL XR) 30 MG extended release capsule; Take 1 capsule by mouth daily for 30 days. Max Daily Amount: 30 mg, Disp-30 capsule, R-0Normal  -     amphetamine-dextroamphetamine (ADDERALL XR) 30 MG extended release capsule; Take 1 capsule by mouth

## 2024-09-18 ENCOUNTER — TELEPHONE (OUTPATIENT)
Dept: PRIMARY CARE CLINIC | Age: 54
End: 2024-09-18

## 2024-10-30 ENCOUNTER — TELEMEDICINE (OUTPATIENT)
Dept: PRIMARY CARE CLINIC | Age: 54
End: 2024-10-30
Payer: COMMERCIAL

## 2024-10-30 DIAGNOSIS — F90.0 ADHD, PREDOMINANTLY INATTENTIVE TYPE: Primary | ICD-10-CM

## 2024-10-30 DIAGNOSIS — Z00.00 ENCOUNTER FOR ANNUAL PHYSICAL EXAM: Primary | ICD-10-CM

## 2024-10-30 DIAGNOSIS — Z72.0 TOBACCO USE: ICD-10-CM

## 2024-10-30 DIAGNOSIS — F90.0 ADHD, PREDOMINANTLY INATTENTIVE TYPE: ICD-10-CM

## 2024-10-30 PROCEDURE — 99214 OFFICE O/P EST MOD 30 MIN: CPT | Performed by: STUDENT IN AN ORGANIZED HEALTH CARE EDUCATION/TRAINING PROGRAM

## 2024-10-30 RX ORDER — DEXTROAMPHETAMINE SACCHARATE, AMPHETAMINE ASPARTATE, DEXTROAMPHETAMINE SULFATE AND AMPHETAMINE SULFATE 5; 5; 5; 5 MG/1; MG/1; MG/1; MG/1
20 TABLET ORAL DAILY
Qty: 30 TABLET | Refills: 0 | Status: SHIPPED | OUTPATIENT
Start: 2024-10-30 | End: 2024-11-29

## 2024-10-30 RX ORDER — DEXTROAMPHETAMINE SACCHARATE, AMPHETAMINE ASPARTATE MONOHYDRATE, DEXTROAMPHETAMINE SULFATE AND AMPHETAMINE SULFATE 7.5; 7.5; 7.5; 7.5 MG/1; MG/1; MG/1; MG/1
30 CAPSULE, EXTENDED RELEASE ORAL DAILY
Qty: 30 CAPSULE | Refills: 0 | Status: SHIPPED | OUTPATIENT
Start: 2024-11-29 | End: 2024-12-29

## 2024-10-30 RX ORDER — DEXTROAMPHETAMINE SACCHARATE, AMPHETAMINE ASPARTATE, DEXTROAMPHETAMINE SULFATE AND AMPHETAMINE SULFATE 5; 5; 5; 5 MG/1; MG/1; MG/1; MG/1
20 TABLET ORAL DAILY
Qty: 30 TABLET | Refills: 0 | Status: SHIPPED | OUTPATIENT
Start: 2024-11-29 | End: 2024-12-29

## 2024-10-30 RX ORDER — DEXTROAMPHETAMINE SACCHARATE, AMPHETAMINE ASPARTATE MONOHYDRATE, DEXTROAMPHETAMINE SULFATE AND AMPHETAMINE SULFATE 7.5; 7.5; 7.5; 7.5 MG/1; MG/1; MG/1; MG/1
30 CAPSULE, EXTENDED RELEASE ORAL DAILY
Qty: 30 CAPSULE | Refills: 0 | Status: SHIPPED | OUTPATIENT
Start: 2024-12-29 | End: 2025-01-28

## 2024-10-30 RX ORDER — DEXTROAMPHETAMINE SACCHARATE, AMPHETAMINE ASPARTATE MONOHYDRATE, DEXTROAMPHETAMINE SULFATE AND AMPHETAMINE SULFATE 7.5; 7.5; 7.5; 7.5 MG/1; MG/1; MG/1; MG/1
30 CAPSULE, EXTENDED RELEASE ORAL DAILY
Qty: 30 CAPSULE | Refills: 0 | Status: SHIPPED | OUTPATIENT
Start: 2024-10-30 | End: 2024-11-29

## 2024-10-30 RX ORDER — NICOTINE 21 MG/24HR
1 PATCH, TRANSDERMAL 24 HOURS TRANSDERMAL EVERY 24 HOURS
Qty: 30 PATCH | Refills: 0 | Status: SHIPPED | OUTPATIENT
Start: 2024-10-30 | End: 2025-10-30

## 2024-10-30 RX ORDER — DEXTROAMPHETAMINE SACCHARATE, AMPHETAMINE ASPARTATE, DEXTROAMPHETAMINE SULFATE AND AMPHETAMINE SULFATE 5; 5; 5; 5 MG/1; MG/1; MG/1; MG/1
20 TABLET ORAL DAILY
Qty: 30 TABLET | Refills: 0 | Status: SHIPPED | OUTPATIENT
Start: 2024-12-29 | End: 2025-01-28

## 2024-10-30 NOTE — ASSESSMENT & PLAN NOTE
Chronic well controlled  Continue current regimen    Orders:    amphetamine-dextroamphetamine (ADDERALL XR) 30 MG extended release capsule; Take 1 capsule by mouth daily for 30 days. Max Daily Amount: 30 mg    amphetamine-dextroamphetamine (ADDERALL XR) 30 MG extended release capsule; Take 1 capsule by mouth daily for 30 days. Max Daily Amount: 30 mg    amphetamine-dextroamphetamine (ADDERALL XR) 30 MG extended release capsule; Take 1 capsule by mouth daily for 30 days. Max Daily Amount: 30 mg    amphetamine-dextroamphetamine (ADDERALL) 20 MG tablet; Take 1 tablet by mouth daily for 30 days. Max Daily Amount: 20 mg    amphetamine-dextroamphetamine (ADDERALL) 20 MG tablet; Take 1 tablet by mouth daily for 30 days. Max Daily Amount: 20 mg    amphetamine-dextroamphetamine (ADDERALL) 20 MG tablet; Take 1 tablet by mouth daily for 30 days. Max Daily Amount: 20 mg

## 2024-10-30 NOTE — PROGRESS NOTES
Sadie Moseley, was evaluated through a synchronous (real-time) audio-video encounter. The patient (or guardian if applicable) is aware that this is a billable service, which includes applicable co-pays. This Virtual Visit was conducted with patient's (and/or legal guardian's) consent. Patient identification was verified, and a caregiver was present when appropriate.   The patient was located at Home: [address] [place] [id number]  Provider was located at Home (Appt Dept State): [place]  Confirm you are appropriately licensed, registered, or certified to deliver care in the state where the patient is located as indicated above. If you are not or unsure, please re-schedule the visit: Yes, I confirm.     Sadie Moseley (:  1970) is a Established patient, presenting virtually for evaluation of the following:      Below is the assessment and plan developed based on review of pertinent history, physical exam, labs, studies, and medications.     Assessment & Plan  ADHD, predominantly inattentive type     Chronic well controlled  Continue current regimen    Orders:    amphetamine-dextroamphetamine (ADDERALL XR) 30 MG extended release capsule; Take 1 capsule by mouth daily for 30 days. Max Daily Amount: 30 mg    amphetamine-dextroamphetamine (ADDERALL XR) 30 MG extended release capsule; Take 1 capsule by mouth daily for 30 days. Max Daily Amount: 30 mg    amphetamine-dextroamphetamine (ADDERALL XR) 30 MG extended release capsule; Take 1 capsule by mouth daily for 30 days. Max Daily Amount: 30 mg    amphetamine-dextroamphetamine (ADDERALL) 20 MG tablet; Take 1 tablet by mouth daily for 30 days. Max Daily Amount: 20 mg    amphetamine-dextroamphetamine (ADDERALL) 20 MG tablet; Take 1 tablet by mouth daily for 30 days. Max Daily Amount: 20 mg    amphetamine-dextroamphetamine (ADDERALL) 20 MG tablet; Take 1 tablet by mouth daily for 30 days. Max Daily Amount: 20 mg    Tobacco use     Chronic well

## 2024-10-30 NOTE — ASSESSMENT & PLAN NOTE
Chronic well controlled  Continue current regimen    Orders:    nicotine (NICODERM CQ) 14 MG/24HR; Place 1 patch onto the skin every 24 hours    nicotine (NICODERM CQ) 7 MG/24HR; Place 1 patch onto the skin every 24 hours

## 2024-12-26 DIAGNOSIS — F90.0 ADHD, PREDOMINANTLY INATTENTIVE TYPE: ICD-10-CM

## 2024-12-26 RX ORDER — DEXTROAMPHETAMINE SACCHARATE, AMPHETAMINE ASPARTATE MONOHYDRATE, DEXTROAMPHETAMINE SULFATE AND AMPHETAMINE SULFATE 7.5; 7.5; 7.5; 7.5 MG/1; MG/1; MG/1; MG/1
30 CAPSULE, EXTENDED RELEASE ORAL DAILY
Qty: 30 CAPSULE | Refills: 0 | Status: SHIPPED | OUTPATIENT
Start: 2024-12-26 | End: 2025-01-25

## 2024-12-26 NOTE — TELEPHONE ENCOUNTER
Medication:   Requested Prescriptions     Pending Prescriptions Disp Refills    amphetamine-dextroamphetamine (ADDERALL XR) 30 MG extended release capsule 30 capsule 0     Sig: Take 1 capsule by mouth daily for 30 days. Max Daily Amount: 30 mg     Last Filled:      Last appt: 10/30/2024   Next appt: Visit date not found    Last OARRS:       3/11/2019     9:09 PM   RX Monitoring   Attestation The Prescription Monitoring Report for this patient was reviewed today.   Periodic Controlled Substance Monitoring No signs of potential drug abuse or diversion identified: otherwise, see note documentation

## 2025-02-26 ENCOUNTER — OFFICE VISIT (OUTPATIENT)
Dept: PRIMARY CARE CLINIC | Age: 55
End: 2025-02-26

## 2025-02-26 VITALS
HEIGHT: 70 IN | WEIGHT: 169 LBS | RESPIRATION RATE: 16 BRPM | BODY MASS INDEX: 24.2 KG/M2 | SYSTOLIC BLOOD PRESSURE: 124 MMHG | TEMPERATURE: 98.7 F | HEART RATE: 84 BPM | OXYGEN SATURATION: 98 % | DIASTOLIC BLOOD PRESSURE: 78 MMHG

## 2025-02-26 DIAGNOSIS — R31.9 HEMATURIA, UNSPECIFIED TYPE: ICD-10-CM

## 2025-02-26 DIAGNOSIS — F90.0 ADHD, PREDOMINANTLY INATTENTIVE TYPE: Primary | ICD-10-CM

## 2025-02-26 LAB
BILIRUBIN, URINE, POC: NORMAL
BLOOD URINE, POC: NEGATIVE
GLUCOSE URINE, POC: NORMAL
KETONES, URINE, POC: NORMAL
LEUKOCYTE ESTERASE, URINE, POC: NEGATIVE
NITRITE, URINE, POC: NEGATIVE
PH, URINE, POC: 5.5 (ref 4.6–8)
PROTEIN,URINE, POC: NEGATIVE
SPECIFIC GRAVITY, URINE, POC: 1.02 (ref 1–1.03)
URINALYSIS CLARITY, POC: CLEAR
URINALYSIS COLOR, POC: NORMAL
UROBILINOGEN, POC: NORMAL MG/DL

## 2025-02-26 RX ORDER — DEXTROAMPHETAMINE SACCHARATE, AMPHETAMINE ASPARTATE MONOHYDRATE, DEXTROAMPHETAMINE SULFATE AND AMPHETAMINE SULFATE 7.5; 7.5; 7.5; 7.5 MG/1; MG/1; MG/1; MG/1
30 CAPSULE, EXTENDED RELEASE ORAL DAILY
Qty: 30 CAPSULE | Refills: 0 | Status: SHIPPED | OUTPATIENT
Start: 2025-04-27 | End: 2025-05-27

## 2025-02-26 RX ORDER — DEXTROAMPHETAMINE SACCHARATE, AMPHETAMINE ASPARTATE MONOHYDRATE, DEXTROAMPHETAMINE SULFATE AND AMPHETAMINE SULFATE 7.5; 7.5; 7.5; 7.5 MG/1; MG/1; MG/1; MG/1
30 CAPSULE, EXTENDED RELEASE ORAL DAILY
Qty: 30 CAPSULE | Refills: 0 | Status: SHIPPED | OUTPATIENT
Start: 2025-02-26 | End: 2025-03-28

## 2025-02-26 RX ORDER — DEXTROAMPHETAMINE SACCHARATE, AMPHETAMINE ASPARTATE, DEXTROAMPHETAMINE SULFATE AND AMPHETAMINE SULFATE 5; 5; 5; 5 MG/1; MG/1; MG/1; MG/1
20 TABLET ORAL DAILY
Qty: 30 TABLET | Refills: 0 | Status: SHIPPED | OUTPATIENT
Start: 2025-03-28 | End: 2025-04-27

## 2025-02-26 RX ORDER — DEXTROAMPHETAMINE SACCHARATE, AMPHETAMINE ASPARTATE, DEXTROAMPHETAMINE SULFATE AND AMPHETAMINE SULFATE 5; 5; 5; 5 MG/1; MG/1; MG/1; MG/1
20 TABLET ORAL DAILY
Qty: 30 TABLET | Refills: 0 | Status: SHIPPED | OUTPATIENT
Start: 2025-04-27 | End: 2025-05-27

## 2025-02-26 RX ORDER — DEXTROAMPHETAMINE SACCHARATE, AMPHETAMINE ASPARTATE MONOHYDRATE, DEXTROAMPHETAMINE SULFATE AND AMPHETAMINE SULFATE 7.5; 7.5; 7.5; 7.5 MG/1; MG/1; MG/1; MG/1
30 CAPSULE, EXTENDED RELEASE ORAL DAILY
Qty: 30 CAPSULE | Refills: 0 | Status: SHIPPED | OUTPATIENT
Start: 2025-03-28 | End: 2025-04-27

## 2025-02-26 RX ORDER — DEXTROAMPHETAMINE SACCHARATE, AMPHETAMINE ASPARTATE, DEXTROAMPHETAMINE SULFATE AND AMPHETAMINE SULFATE 5; 5; 5; 5 MG/1; MG/1; MG/1; MG/1
20 TABLET ORAL DAILY
Qty: 30 TABLET | Refills: 0 | Status: SHIPPED | OUTPATIENT
Start: 2025-02-26 | End: 2025-03-28

## 2025-02-26 SDOH — ECONOMIC STABILITY: FOOD INSECURITY: WITHIN THE PAST 12 MONTHS, THE FOOD YOU BOUGHT JUST DIDN'T LAST AND YOU DIDN'T HAVE MONEY TO GET MORE.: NEVER TRUE

## 2025-02-26 SDOH — ECONOMIC STABILITY: FOOD INSECURITY: WITHIN THE PAST 12 MONTHS, YOU WORRIED THAT YOUR FOOD WOULD RUN OUT BEFORE YOU GOT MONEY TO BUY MORE.: NEVER TRUE

## 2025-02-26 ASSESSMENT — PATIENT HEALTH QUESTIONNAIRE - PHQ9
SUM OF ALL RESPONSES TO PHQ QUESTIONS 1-9: 0
SUM OF ALL RESPONSES TO PHQ9 QUESTIONS 1 & 2: 0
SUM OF ALL RESPONSES TO PHQ QUESTIONS 1-9: 0
1. LITTLE INTEREST OR PLEASURE IN DOING THINGS: NOT AT ALL
SUM OF ALL RESPONSES TO PHQ QUESTIONS 1-9: 0
SUM OF ALL RESPONSES TO PHQ QUESTIONS 1-9: 0
2. FEELING DOWN, DEPRESSED OR HOPELESS: NOT AT ALL

## 2025-02-26 NOTE — PROGRESS NOTES
Sadie Moseley (:  1970) is a 54 y.o. female,Established patient, here for evaluation of the following chief complaint(s):  No chief complaint on file.         Assessment & Plan  ADHD, predominantly inattentive type     Chronic well controlled  Continue current regimen    Orders:    amphetamine-dextroamphetamine (ADDERALL) 20 MG tablet; Take 1 tablet by mouth daily for 30 days. Max Daily Amount: 20 mg    amphetamine-dextroamphetamine (ADDERALL) 20 MG tablet; Take 1 tablet by mouth daily for 30 days. Max Daily Amount: 20 mg    amphetamine-dextroamphetamine (ADDERALL) 20 MG tablet; Take 1 tablet by mouth daily for 30 days. Max Daily Amount: 20 mg    amphetamine-dextroamphetamine (ADDERALL XR) 30 MG extended release capsule; Take 1 capsule by mouth daily for 30 days. Max Daily Amount: 30 mg    amphetamine-dextroamphetamine (ADDERALL XR) 30 MG extended release capsule; Take 1 capsule by mouth daily for 30 days. Max Daily Amount: 30 mg    amphetamine-dextroamphetamine (ADDERALL XR) 30 MG extended release capsule; Take 1 capsule by mouth daily for 30 days. Max Daily Amount: 30 mg    Drug Panel-PM-HI Res-UR Interp-A    Hematuria, unspecified type     Will send for culture  If culture normal then we will need to get CT urogram to assess this hematuria.  Of note, urine dip did not show any blood  Orders:    AMB POC URINALYSIS DIP STICK AUTO W/ MICRO    Culture, Urine      Return if symptoms worsen or fail to improve.       Subjective   HPI    2 weeks ago had dysuria but then went away but then yesterday had some blood in her urine but not a lot of dysuria and no flank pain no history of kidney stones, had partial hysterectomy     Review of Systems   All other systems reviewed and are negative.         Objective   Physical Exam  Constitutional:       Appearance: Normal appearance.   HENT:      Head: Normocephalic and atraumatic.      Nose: Nose normal.      Mouth/Throat:      Mouth: Mucous membranes are moist.

## 2025-02-26 NOTE — ASSESSMENT & PLAN NOTE
Chronic well controlled  Continue current regimen    Orders:    amphetamine-dextroamphetamine (ADDERALL) 20 MG tablet; Take 1 tablet by mouth daily for 30 days. Max Daily Amount: 20 mg    amphetamine-dextroamphetamine (ADDERALL) 20 MG tablet; Take 1 tablet by mouth daily for 30 days. Max Daily Amount: 20 mg    amphetamine-dextroamphetamine (ADDERALL) 20 MG tablet; Take 1 tablet by mouth daily for 30 days. Max Daily Amount: 20 mg    amphetamine-dextroamphetamine (ADDERALL XR) 30 MG extended release capsule; Take 1 capsule by mouth daily for 30 days. Max Daily Amount: 30 mg    amphetamine-dextroamphetamine (ADDERALL XR) 30 MG extended release capsule; Take 1 capsule by mouth daily for 30 days. Max Daily Amount: 30 mg    amphetamine-dextroamphetamine (ADDERALL XR) 30 MG extended release capsule; Take 1 capsule by mouth daily for 30 days. Max Daily Amount: 30 mg    Drug Panel-PM-HI Res-UR Interp-A

## 2025-02-27 LAB — BACTERIA UR CULT: NORMAL

## 2025-02-28 DIAGNOSIS — R31.0 GROSS HEMATURIA: Primary | ICD-10-CM

## 2025-03-14 ENCOUNTER — RESULTS FOLLOW-UP (OUTPATIENT)
Dept: PRIMARY CARE CLINIC | Age: 55
End: 2025-03-14

## 2025-03-28 ENCOUNTER — TELEPHONE (OUTPATIENT)
Dept: PRIMARY CARE CLINIC | Age: 55
End: 2025-03-28

## 2025-03-28 ENCOUNTER — HOSPITAL ENCOUNTER (OUTPATIENT)
Age: 55
Discharge: HOME | End: 2025-03-28
Payer: COMMERCIAL

## 2025-03-28 DIAGNOSIS — R31.9 HEMATURIA, UNSPECIFIED TYPE: Primary | ICD-10-CM

## 2025-03-28 DIAGNOSIS — N32.89 MASS OF BLADDER: ICD-10-CM

## 2025-03-28 DIAGNOSIS — R31.0 GROSS HEMATURIA: ICD-10-CM

## 2025-03-28 PROCEDURE — 74178 CT ABD&PLV WO CNTR FLWD CNTR: CPT | Performed by: STUDENT IN AN ORGANIZED HEALTH CARE EDUCATION/TRAINING PROGRAM

## 2025-03-28 PROCEDURE — 6360000004 HC RX CONTRAST MEDICATION: Performed by: STUDENT IN AN ORGANIZED HEALTH CARE EDUCATION/TRAINING PROGRAM

## 2025-03-28 RX ORDER — IOPAMIDOL 755 MG/ML
75 INJECTION, SOLUTION INTRAVASCULAR
Status: COMPLETED | OUTPATIENT
Start: 2025-03-28 | End: 2025-03-28

## 2025-03-28 RX ADMIN — IOPAMIDOL 75 ML: 755 INJECTION, SOLUTION INTRAVENOUS at 08:41

## 2025-03-28 NOTE — TELEPHONE ENCOUNTER
Spoke to Dr. Gamble regarding STAT referral to Urology for bladder mass. Referral put in for urology group.     Spoke to Urology group and patient scheduled for 04/09/25 at 9 am with Dr. Mike Eckert in [place].     Spoke to patient and notified of appointment. Patient verbalized understanding. Will reach out if appointment needs to be changed for any reason.     Referral, Demographics and CT report faxed to Dr. Eckert's office as requested by urology group.

## 2025-06-18 DIAGNOSIS — F90.0 ADHD, PREDOMINANTLY INATTENTIVE TYPE: ICD-10-CM

## 2025-06-18 NOTE — TELEPHONE ENCOUNTER
Medication:   Requested Prescriptions     Pending Prescriptions Disp Refills    amphetamine-dextroamphetamine (ADDERALL) 20 MG tablet 30 tablet 0     Sig: Take 1 tablet by mouth daily for 30 days. Max Daily Amount: 20 mg       Last appt: 2/26/2025   Next appt: 6/26/2025    Last OARRS:       3/11/2019     9:09 PM   RX Monitoring   Attestation The Prescription Monitoring Report for this patient was reviewed today.   Periodic Controlled Substance Monitoring No signs of potential drug abuse or diversion identified: otherwise, see note documentation

## 2025-06-19 RX ORDER — DEXTROAMPHETAMINE SACCHARATE, AMPHETAMINE ASPARTATE, DEXTROAMPHETAMINE SULFATE AND AMPHETAMINE SULFATE 5; 5; 5; 5 MG/1; MG/1; MG/1; MG/1
20 TABLET ORAL DAILY
Qty: 30 TABLET | Refills: 0 | Status: SHIPPED | OUTPATIENT
Start: 2025-06-19 | End: 2025-07-19

## 2025-06-26 ENCOUNTER — OFFICE VISIT (OUTPATIENT)
Dept: PRIMARY CARE CLINIC | Age: 55
End: 2025-06-26

## 2025-06-26 VITALS
WEIGHT: 165 LBS | HEART RATE: 73 BPM | BODY MASS INDEX: 23.68 KG/M2 | OXYGEN SATURATION: 98 % | DIASTOLIC BLOOD PRESSURE: 72 MMHG | SYSTOLIC BLOOD PRESSURE: 128 MMHG

## 2025-06-26 DIAGNOSIS — F90.0 ADHD, PREDOMINANTLY INATTENTIVE TYPE: ICD-10-CM

## 2025-06-26 DIAGNOSIS — Z12.11 SCREEN FOR COLON CANCER: ICD-10-CM

## 2025-06-26 DIAGNOSIS — Z00.00 ENCOUNTER FOR ANNUAL PHYSICAL EXAM: ICD-10-CM

## 2025-06-26 DIAGNOSIS — Z23 NEED FOR TDAP VACCINATION: ICD-10-CM

## 2025-06-26 DIAGNOSIS — C67.9 MALIGNANT NEOPLASM OF URINARY BLADDER, UNSPECIFIED SITE (HCC): ICD-10-CM

## 2025-06-26 DIAGNOSIS — Z12.31 SCREENING MAMMOGRAM FOR BREAST CANCER: Primary | ICD-10-CM

## 2025-06-26 DIAGNOSIS — Z23 NEED FOR PNEUMOCOCCAL VACCINE: ICD-10-CM

## 2025-06-26 LAB
ALBUMIN SERPL-MCNC: 4.2 G/DL (ref 3.4–5)
ALBUMIN/GLOB SERPL: 2.1 {RATIO} (ref 1.1–2.2)
ALP SERPL-CCNC: 98 U/L (ref 40–129)
ALT SERPL-CCNC: 17 U/L (ref 10–40)
ANION GAP SERPL CALCULATED.3IONS-SCNC: 9 MMOL/L (ref 3–16)
ANNOTATION COMMENT IMP: NORMAL
AST SERPL-CCNC: 23 U/L (ref 15–37)
BILIRUB SERPL-MCNC: <0.2 MG/DL (ref 0–1)
BUN SERPL-MCNC: 16 MG/DL (ref 7–20)
CALCIUM SERPL-MCNC: 9.2 MG/DL (ref 8.3–10.6)
CHLORIDE SERPL-SCNC: 104 MMOL/L (ref 99–110)
CHOLEST SERPL-MCNC: 196 MG/DL (ref 0–199)
CO2 SERPL-SCNC: 26 MMOL/L (ref 21–32)
CREAT SERPL-MCNC: 0.6 MG/DL (ref 0.6–1.1)
DEPRECATED RDW RBC AUTO: 14.6 % (ref 12.4–15.4)
GFR SERPLBLD CREATININE-BSD FMLA CKD-EPI: >90 ML/MIN/{1.73_M2}
GLUCOSE SERPL-MCNC: 84 MG/DL (ref 70–99)
HCT VFR BLD AUTO: 43.9 % (ref 36–48)
HDLC SERPL-MCNC: 43 MG/DL (ref 40–60)
HGB BLD-MCNC: 14.7 G/DL (ref 12–16)
LDLC SERPL CALC-MCNC: 135 MG/DL
MCH RBC QN AUTO: 30.4 PG (ref 26–34)
MCHC RBC AUTO-ENTMCNC: 33.6 G/DL (ref 31–36)
MCV RBC AUTO: 90.7 FL (ref 80–100)
PLATELET # BLD AUTO: 267 K/UL (ref 135–450)
PMV BLD AUTO: 9.9 FL (ref 5–10.5)
POTASSIUM SERPL-SCNC: 4.9 MMOL/L (ref 3.5–5.1)
PROT SERPL-MCNC: 6.2 G/DL (ref 6.4–8.2)
RBC # BLD AUTO: 4.84 M/UL (ref 4–5.2)
SODIUM SERPL-SCNC: 139 MMOL/L (ref 136–145)
TRIGL SERPL-MCNC: 90 MG/DL (ref 0–150)
VLDLC SERPL CALC-MCNC: 18 MG/DL
WBC # BLD AUTO: 10 K/UL (ref 4–11)

## 2025-06-26 RX ORDER — DEXTROAMPHETAMINE SACCHARATE, AMPHETAMINE ASPARTATE MONOHYDRATE, DEXTROAMPHETAMINE SULFATE AND AMPHETAMINE SULFATE 7.5; 7.5; 7.5; 7.5 MG/1; MG/1; MG/1; MG/1
30 CAPSULE, EXTENDED RELEASE ORAL DAILY
Qty: 30 CAPSULE | Refills: 0 | Status: SHIPPED | OUTPATIENT
Start: 2025-07-26 | End: 2025-08-25

## 2025-06-26 RX ORDER — DEXTROAMPHETAMINE SACCHARATE, AMPHETAMINE ASPARTATE, DEXTROAMPHETAMINE SULFATE AND AMPHETAMINE SULFATE 5; 5; 5; 5 MG/1; MG/1; MG/1; MG/1
20 TABLET ORAL DAILY
Qty: 30 TABLET | Refills: 0 | Status: SHIPPED | OUTPATIENT
Start: 2025-09-17 | End: 2025-10-17

## 2025-06-26 RX ORDER — DEXTROAMPHETAMINE SACCHARATE, AMPHETAMINE ASPARTATE MONOHYDRATE, DEXTROAMPHETAMINE SULFATE AND AMPHETAMINE SULFATE 7.5; 7.5; 7.5; 7.5 MG/1; MG/1; MG/1; MG/1
30 CAPSULE, EXTENDED RELEASE ORAL DAILY
Qty: 30 CAPSULE | Refills: 0 | Status: SHIPPED | OUTPATIENT
Start: 2025-08-25 | End: 2025-09-24

## 2025-06-26 RX ORDER — DEXTROAMPHETAMINE SACCHARATE, AMPHETAMINE ASPARTATE, DEXTROAMPHETAMINE SULFATE AND AMPHETAMINE SULFATE 5; 5; 5; 5 MG/1; MG/1; MG/1; MG/1
20 TABLET ORAL DAILY
Qty: 30 TABLET | Refills: 0 | Status: SHIPPED | OUTPATIENT
Start: 2025-07-19 | End: 2025-08-18

## 2025-06-26 RX ORDER — DEXTROAMPHETAMINE SACCHARATE, AMPHETAMINE ASPARTATE MONOHYDRATE, DEXTROAMPHETAMINE SULFATE AND AMPHETAMINE SULFATE 7.5; 7.5; 7.5; 7.5 MG/1; MG/1; MG/1; MG/1
30 CAPSULE, EXTENDED RELEASE ORAL DAILY
Qty: 30 CAPSULE | Refills: 0 | Status: SHIPPED | OUTPATIENT
Start: 2025-06-26 | End: 2025-07-26

## 2025-06-26 RX ORDER — DEXTROAMPHETAMINE SACCHARATE, AMPHETAMINE ASPARTATE MONOHYDRATE, DEXTROAMPHETAMINE SULFATE AND AMPHETAMINE SULFATE 7.5; 7.5; 7.5; 7.5 MG/1; MG/1; MG/1; MG/1
30 CAPSULE, EXTENDED RELEASE ORAL DAILY
Qty: 30 CAPSULE | Refills: 0 | Status: CANCELLED | OUTPATIENT
Start: 2025-06-26 | End: 2025-07-26

## 2025-06-26 RX ORDER — DEXTROAMPHETAMINE SACCHARATE, AMPHETAMINE ASPARTATE, DEXTROAMPHETAMINE SULFATE AND AMPHETAMINE SULFATE 5; 5; 5; 5 MG/1; MG/1; MG/1; MG/1
20 TABLET ORAL DAILY
Qty: 30 TABLET | Refills: 0 | Status: SHIPPED | OUTPATIENT
Start: 2025-08-18 | End: 2025-09-17

## 2025-06-26 SDOH — ECONOMIC STABILITY: INCOME INSECURITY: IN THE LAST 12 MONTHS, WAS THERE A TIME WHEN YOU WERE NOT ABLE TO PAY THE MORTGAGE OR RENT ON TIME?: NO

## 2025-06-26 SDOH — ECONOMIC STABILITY: TRANSPORTATION INSECURITY
IN THE PAST 12 MONTHS, HAS THE LACK OF TRANSPORTATION KEPT YOU FROM MEDICAL APPOINTMENTS OR FROM GETTING MEDICATIONS?: NO

## 2025-06-26 SDOH — ECONOMIC STABILITY: FOOD INSECURITY: WITHIN THE PAST 12 MONTHS, THE FOOD YOU BOUGHT JUST DIDN'T LAST AND YOU DIDN'T HAVE MONEY TO GET MORE.: NEVER TRUE

## 2025-06-26 SDOH — ECONOMIC STABILITY: FOOD INSECURITY: WITHIN THE PAST 12 MONTHS, YOU WORRIED THAT YOUR FOOD WOULD RUN OUT BEFORE YOU GOT MONEY TO BUY MORE.: NEVER TRUE

## 2025-06-26 NOTE — PROGRESS NOTES
Sadie Moseley (:  1970) is a 54 y.o. female,Established patient, here for evaluation of the following chief complaint(s):  Follow-up (Medication check. )         Assessment & Plan  ADHD, predominantly inattentive type     Chronic well controlled  Continue current regimen    Orders:    amphetamine-dextroamphetamine (ADDERALL XR) 30 MG extended release capsule; Take 1 capsule by mouth daily for 30 days. Max Daily Amount: 30 mg    amphetamine-dextroamphetamine (ADDERALL XR) 30 MG extended release capsule; Take 1 capsule by mouth daily for 30 days. Max Daily Amount: 30 mg    amphetamine-dextroamphetamine (ADDERALL XR) 30 MG extended release capsule; Take 1 capsule by mouth daily for 30 days. Max Daily Amount: 30 mg    amphetamine-dextroamphetamine (ADDERALL) 20 MG tablet; Take 1 tablet by mouth daily for 30 days. Max Daily Amount: 20 mg    amphetamine-dextroamphetamine (ADDERALL) 20 MG tablet; Take 1 tablet by mouth daily for 30 days. Max Daily Amount: 20 mg    amphetamine-dextroamphetamine (ADDERALL) 20 MG tablet; Take 1 tablet by mouth daily for 30 days. Max Daily Amount: 20 mg    Drug Panel-PM-HI Res-UR Interp-A    Screening mammogram for breast cancer       Orders:    [facility] VASYL DIGITAL SCREEN BILATERAL; Future    Screen for colon cancer       Orders:    Mee San MD, Gastroenterology (ERCP & EUS), [facility]    Need for pneumococcal vaccine       Orders:    Pneumococcal, PCV20, PREVNAR 20, (age 6w+), IM, PF    Need for Tdap vaccination       Orders:    Tdap, BOOSTRIX, (age 10 yrs+), IM    Malignant neoplasm of urinary bladder, unspecified site (HCC)     Per urology  asymptomatic         Return in 3 months (on 2025), or if symptoms worsen or fail to improve, for chronic problems.       Subjective   HPI    Went to urology and had bladder cancer and had procedure and was told they got it all an will follow with them q6 mths     ADHD well controlled    Review of Systems   All other

## 2025-06-26 NOTE — ASSESSMENT & PLAN NOTE
Chronic well controlled  Continue current regimen    Orders:    amphetamine-dextroamphetamine (ADDERALL XR) 30 MG extended release capsule; Take 1 capsule by mouth daily for 30 days. Max Daily Amount: 30 mg    amphetamine-dextroamphetamine (ADDERALL XR) 30 MG extended release capsule; Take 1 capsule by mouth daily for 30 days. Max Daily Amount: 30 mg    amphetamine-dextroamphetamine (ADDERALL XR) 30 MG extended release capsule; Take 1 capsule by mouth daily for 30 days. Max Daily Amount: 30 mg    amphetamine-dextroamphetamine (ADDERALL) 20 MG tablet; Take 1 tablet by mouth daily for 30 days. Max Daily Amount: 20 mg    amphetamine-dextroamphetamine (ADDERALL) 20 MG tablet; Take 1 tablet by mouth daily for 30 days. Max Daily Amount: 20 mg    amphetamine-dextroamphetamine (ADDERALL) 20 MG tablet; Take 1 tablet by mouth daily for 30 days. Max Daily Amount: 20 mg    Drug Panel-PM-[facility] Res- Interp-A

## 2025-06-27 ENCOUNTER — RESULTS FOLLOW-UP (OUTPATIENT)
Dept: PRIMARY CARE CLINIC | Age: 55
End: 2025-06-27

## 2025-06-30 LAB
6MAM UR QL: NOT DETECTED
7AMINOCLONAZEPAM UR QL: NOT DETECTED
A-OH ALPRAZ UR QL: NOT DETECTED
ALPHA-OH-MIDAZOLAM, URINE: NOT DETECTED
ALPRAZ UR QL: NOT DETECTED
AMPHET UR QL SCN: PRESENT
ANNOTATION COMMENT IMP: NORMAL
ANNOTATION COMMENT IMP: NORMAL
BARBITURATES UR QL: NEGATIVE
BUPRENORPHINE UR QL: NOT DETECTED
BZE UR QL: NEGATIVE
CARBOXYTHC UR QL: NEGATIVE
CARISOPRODOL UR QL: NEGATIVE
CLONAZEPAM UR QL: NOT DETECTED
CODEINE UR QL: NOT DETECTED
CREAT UR-MCNC: 100.7 MG/DL (ref 20–400)
DIAZEPAM UR QL: NOT DETECTED
ETHYL GLUCURONIDE UR QL: NEGATIVE
FENTANYL UR QL: NOT DETECTED
GABAPENTIN: NOT DETECTED
HYDROCODONE UR QL: NOT DETECTED
HYDROMORPHONE UR QL: NOT DETECTED
LORAZEPAM UR QL: NOT DETECTED
MDA UR QL: NOT DETECTED
MDEA UR QL: NOT DETECTED
MDMA UR QL: NOT DETECTED
MEPERIDINE UR QL: NOT DETECTED
METHADONE UR QL: NEGATIVE
METHAMPHET UR QL: NOT DETECTED
MIDAZOLAM UR QL SCN: NOT DETECTED
MORPHINE UR QL: NOT DETECTED
NALOXONE: NOT DETECTED
NORBUPRENORPHINE UR QL CFM: NOT DETECTED
NORDIAZEPAM UR QL: NOT DETECTED
NORFENTANYL UR QL: NOT DETECTED
NORHYDROCODONE UR QL CFM: NOT DETECTED
NOROXYCODONE UR QL CFM: NOT DETECTED
NOROXYMORPHONE, URINE: NOT DETECTED
OXAZEPAM UR QL: NOT DETECTED
OXYCODONE UR QL: NOT DETECTED
OXYMORPHONE UR QL: NOT DETECTED
PATHOLOGY STUDY: NORMAL
PCP UR QL: NEGATIVE
PHENTERMINE UR QL: NOT DETECTED
PPAA UR QL: NOT DETECTED
PREGABALIN: NOT DETECTED
SERVICE CMNT-IMP: NORMAL
TAPENTADOL UR QL SCN: NOT DETECTED
TAPENTADOL-O-SULFATE, URINE: NOT DETECTED
TEMAZEPAM UR QL: NOT DETECTED
TRAMADOL UR QL: NEGATIVE
ZOLPIDEM UR QL: NOT DETECTED

## 2025-07-02 ENCOUNTER — RESULTS FOLLOW-UP (OUTPATIENT)
Dept: PRIMARY CARE CLINIC | Age: 55
End: 2025-07-02

## 2025-08-19 DIAGNOSIS — F90.0 ADHD, PREDOMINANTLY INATTENTIVE TYPE: ICD-10-CM

## 2025-08-19 RX ORDER — DEXTROAMPHETAMINE SACCHARATE, AMPHETAMINE ASPARTATE, DEXTROAMPHETAMINE SULFATE AND AMPHETAMINE SULFATE 5; 5; 5; 5 MG/1; MG/1; MG/1; MG/1
20 TABLET ORAL DAILY
Qty: 30 TABLET | Refills: 0 | Status: SHIPPED | OUTPATIENT
Start: 2025-08-19 | End: 2025-09-18

## 2025-09-02 DIAGNOSIS — F90.0 ADHD, PREDOMINANTLY INATTENTIVE TYPE: ICD-10-CM

## 2025-09-02 RX ORDER — DEXTROAMPHETAMINE SACCHARATE, AMPHETAMINE ASPARTATE MONOHYDRATE, DEXTROAMPHETAMINE SULFATE AND AMPHETAMINE SULFATE 7.5; 7.5; 7.5; 7.5 MG/1; MG/1; MG/1; MG/1
30 CAPSULE, EXTENDED RELEASE ORAL DAILY
Qty: 30 CAPSULE | Refills: 0 | Status: SHIPPED | OUTPATIENT
Start: 2025-09-02 | End: 2025-10-02